# Patient Record
Sex: FEMALE | Race: WHITE | Employment: PART TIME | URBAN - METROPOLITAN AREA
[De-identification: names, ages, dates, MRNs, and addresses within clinical notes are randomized per-mention and may not be internally consistent; named-entity substitution may affect disease eponyms.]

---

## 2017-01-13 ENCOUNTER — NURSE ONLY (OUTPATIENT)
Dept: FAMILY MEDICINE CLINIC | Facility: CLINIC | Age: 37
End: 2017-01-13

## 2017-01-13 PROCEDURE — 96372 THER/PROPH/DIAG INJ SC/IM: CPT | Performed by: PHYSICIAN ASSISTANT

## 2017-01-13 RX ADMIN — CYANOCOBALAMIN 1000 MCG: 1000 INJECTION INTRAMUSCULAR; SUBCUTANEOUS at 09:40:00

## 2017-01-13 NOTE — PROGRESS NOTES
Notes Recorded by Titus Guerra PA-C on 11/21/2016 at 1:00 PM  Labs are stable  B12 is still low, would still continue B12 injections monthly  F/U as directed    Vitamin B12 [phone] pg/mL Final 11/16/2016 11:31 AM [facility]     Assessment and P

## 2017-02-03 ENCOUNTER — APPOINTMENT (OUTPATIENT)
Dept: LAB | Age: 37
End: 2017-02-03
Attending: INTERNAL MEDICINE
Payer: COMMERCIAL

## 2017-02-03 DIAGNOSIS — E06.3 HASHIMOTO'S DISEASE: ICD-10-CM

## 2017-02-03 LAB
FREE T4: 1.1 NG/DL (ref 0.9–1.8)
T3FREE SERPL-MCNC: 2.63 PG/ML (ref 2.3–4.2)
TSI SER-ACNC: 0.53 MIU/ML (ref 0.35–5.5)

## 2017-02-03 PROCEDURE — 36415 COLL VENOUS BLD VENIPUNCTURE: CPT

## 2017-02-03 PROCEDURE — 84439 ASSAY OF FREE THYROXINE: CPT

## 2017-02-03 PROCEDURE — 84443 ASSAY THYROID STIM HORMONE: CPT

## 2017-02-03 PROCEDURE — 84481 FREE ASSAY (FT-3): CPT

## 2017-02-16 ENCOUNTER — NURSE ONLY (OUTPATIENT)
Dept: FAMILY MEDICINE CLINIC | Facility: CLINIC | Age: 37
End: 2017-02-16

## 2017-02-16 DIAGNOSIS — E53.8 VITAMIN B12 DEFICIENCY: Primary | ICD-10-CM

## 2017-02-16 PROCEDURE — 96372 THER/PROPH/DIAG INJ SC/IM: CPT | Performed by: PHYSICIAN ASSISTANT

## 2017-02-16 RX ADMIN — CYANOCOBALAMIN 1000 MCG: 1000 INJECTION INTRAMUSCULAR; SUBCUTANEOUS at 10:48:00

## 2017-02-16 NOTE — PROGRESS NOTES
Notes Recorded by Nat Monreal PA-C on 11/21/2016 at 1:00 PM  Labs are stable  B12 is still low, would still continue B12 injections monthly  F/U as directed       Result Information      Status Provider Status        Final result (11/16/2016  7:39

## 2017-03-22 ENCOUNTER — NURSE ONLY (OUTPATIENT)
Dept: FAMILY MEDICINE CLINIC | Facility: CLINIC | Age: 37
End: 2017-03-22

## 2017-03-22 DIAGNOSIS — E53.8 VITAMIN B12 DEFICIENCY: Primary | ICD-10-CM

## 2017-03-22 PROCEDURE — 96372 THER/PROPH/DIAG INJ SC/IM: CPT | Performed by: PHYSICIAN ASSISTANT

## 2017-03-22 RX ADMIN — CYANOCOBALAMIN 1000 MCG: 1000 INJECTION INTRAMUSCULAR; SUBCUTANEOUS at 09:24:00

## 2017-03-22 NOTE — PROGRESS NOTES
Notes Recorded by Rosanna Aguilera PA-C on 11/21/2016 at 1:00 PM  Labs are stable  B12 is still low, would still continue B12 injections monthly  F/U as directed         Result Information     [facility] Provider Status           Final result (11/16/20

## 2017-04-25 ENCOUNTER — NURSE ONLY (OUTPATIENT)
Dept: FAMILY MEDICINE CLINIC | Facility: CLINIC | Age: 37
End: 2017-04-25

## 2017-04-25 PROCEDURE — 96372 THER/PROPH/DIAG INJ SC/IM: CPT | Performed by: PHYSICIAN ASSISTANT

## 2017-04-25 RX ADMIN — CYANOCOBALAMIN 1000 MCG: 1000 INJECTION INTRAMUSCULAR; SUBCUTANEOUS at 14:58:00

## 2017-04-25 NOTE — PROGRESS NOTES
Notes Recorded by Titus Guerra PA-C on 11/21/2016 at 1:00 PM  Labs are stable  B12 is still low, would still continue B12 injections monthly  F/U as directed           Result Information Aubree Reddy Status   Provider Status              Final result (

## 2017-04-25 NOTE — PROGRESS NOTES
Patient is here for Vitamin B12 injection #5. Given to left deltoid IM without incident. Scheduled for next shot in one month. Will be last shot.

## 2017-05-05 ENCOUNTER — OFFICE VISIT (OUTPATIENT)
Dept: FAMILY MEDICINE CLINIC | Facility: CLINIC | Age: 37
End: 2017-05-05

## 2017-05-05 VITALS
DIASTOLIC BLOOD PRESSURE: 70 MMHG | WEIGHT: 125 LBS | HEART RATE: 68 BPM | SYSTOLIC BLOOD PRESSURE: 90 MMHG | RESPIRATION RATE: 16 BRPM | BODY MASS INDEX: 20.09 KG/M2 | HEIGHT: 66.25 IN | TEMPERATURE: 99 F

## 2017-05-05 DIAGNOSIS — E06.3 HASHIMOTO'S DISEASE: ICD-10-CM

## 2017-05-05 DIAGNOSIS — E55.9 VITAMIN D DEFICIENCY: ICD-10-CM

## 2017-05-05 DIAGNOSIS — E53.8 VITAMIN B12 DEFICIENCY: Primary | ICD-10-CM

## 2017-05-05 PROCEDURE — 99213 OFFICE O/P EST LOW 20 MIN: CPT | Performed by: INTERNAL MEDICINE

## 2017-05-05 RX ORDER — LIOTHYRONINE SODIUM 5 UG/1
5 TABLET ORAL DAILY
COMMUNITY
Start: 2017-05-02 | End: 2021-01-04

## 2017-05-05 NOTE — PATIENT INSTRUCTIONS
Thank you for choosing Rizwan Hernandez MD at [place] [address]  To Do: Karen De Jesus      • Please signup for MY CHART, which is electronic access to your record if you have not done so.  All your results will post on there.  [email or web address] you regarding any testing results. Refill policies:   Allow 3 business days for refills; controlled substances may take longer and must be picked up from the office in person.   Narcotic medications can only be filled in 30 day increments and must be ref

## 2017-05-05 NOTE — PROGRESS NOTES
[facility] Internal Medicine Office Note  Chief Complaint:   Patient presents with:  Establish Care      HPI:   This is a 39year old female coming in for establishing care with a new provider.  She has been feeling well and much better than in 20 Tab Take 30 mg by mouth one time. Disp:  Rfl:    MAGNESIUM GLYCINATE PLUS OR Take by mouth. Disp:  Rfl:    Levothyroxine Sodium 50 MCG Oral Tab Take 1 tablet (50 mcg total) by mouth daily.  Disp: 30 tablet Rfl: 6   Liothyronine Sodium 5 MCG Oral Tab Take 5 medicine doctor trying to go off of injections and taking oral replacement only.  I have some concern about this because of malabsorption of numerous other vitamins and minerals and she was symptomatic with parasthesias, although the lowest B12 in EPIC is a

## 2017-11-28 ENCOUNTER — TELEPHONE (OUTPATIENT)
Dept: FAMILY MEDICINE CLINIC | Facility: CLINIC | Age: 37
End: 2017-11-28

## 2017-11-28 NOTE — TELEPHONE ENCOUNTER
Advised pt that she can receive injection and TB test at her next OV. Patient verbalized understanding.    Future Appointments  Date Time Provider Petros Garcia   12/8/2017 9:00 AM Benja Sandhu MD [facility] [address] [facility] [address]

## 2017-11-28 NOTE — TELEPHONE ENCOUNTER
Dr Akira Ibrahim pt wants to know if she can have a tdap and tb test  on the same day as her appt on 12/8/17 , would like a nurse to call her back on decision

## 2017-12-08 ENCOUNTER — OFFICE VISIT (OUTPATIENT)
Dept: INTERNAL MEDICINE CLINIC | Facility: CLINIC | Age: 37
End: 2017-12-08

## 2017-12-08 ENCOUNTER — LAB ENCOUNTER (OUTPATIENT)
Dept: LAB | Age: 37
End: 2017-12-08
Attending: INTERNAL MEDICINE
Payer: COMMERCIAL

## 2017-12-08 VITALS
SYSTOLIC BLOOD PRESSURE: 94 MMHG | BODY MASS INDEX: 19.85 KG/M2 | RESPIRATION RATE: 16 BRPM | HEIGHT: 68 IN | WEIGHT: 131 LBS | HEART RATE: 76 BPM | DIASTOLIC BLOOD PRESSURE: 60 MMHG

## 2017-12-08 DIAGNOSIS — Z00.00 LABORATORY EXAM ORDERED AS PART OF ROUTINE GENERAL MEDICAL EXAMINATION: ICD-10-CM

## 2017-12-08 DIAGNOSIS — N28.1 RENAL CYST: ICD-10-CM

## 2017-12-08 DIAGNOSIS — Z00.00 WELLNESS EXAMINATION: Primary | ICD-10-CM

## 2017-12-08 PROCEDURE — 99395 PREV VISIT EST AGE 18-39: CPT | Performed by: INTERNAL MEDICINE

## 2017-12-08 PROCEDURE — 80053 COMPREHEN METABOLIC PANEL: CPT

## 2017-12-08 PROCEDURE — 36415 COLL VENOUS BLD VENIPUNCTURE: CPT

## 2017-12-08 PROCEDURE — 90471 IMMUNIZATION ADMIN: CPT | Performed by: INTERNAL MEDICINE

## 2017-12-08 PROCEDURE — 85025 COMPLETE CBC W/AUTO DIFF WBC: CPT

## 2017-12-08 PROCEDURE — 90715 TDAP VACCINE 7 YRS/> IM: CPT | Performed by: INTERNAL MEDICINE

## 2017-12-08 RX ORDER — THYROID,PORK 48.75 MG
2 TABLET ORAL DAILY
COMMUNITY
Start: 2017-11-30 | End: 2021-01-04

## 2017-12-08 NOTE — PROGRESS NOTES
[facility] Internal Medicine Office Note  Chief Complaint:   Patient presents with:  Wellness Visit: tdap      HPI:   This is a 44year old female coming in for physical  HPI  She follows with a functional medicine doctor and no longer sees Dr. Sunni Saini Zinc 30 MG Oral Tab Take 30 mg by mouth one time. Disp:  Rfl:    MAGNESIUM GLYCINATE PLUS OR Take by mouth. Disp:  Rfl:          REVIEW OF SYSTEMS:   Review of Systems   Constitutional: Negative for fever. HENT: Negative for rhinorrhea.     Eyes: Marge Aus testing and chol was low. Laboratory exam ordered as part of routine general medical examination  -     CBC WITH DIFFERENTIAL WITH PLATELET; Future  -     COMP METABOLIC PANEL (14); Future    Renal cyst  -     US KIDNEYS (VIW=[id number]);  Future    Other or

## 2017-12-12 ENCOUNTER — HOSPITAL ENCOUNTER (OUTPATIENT)
Dept: ULTRASOUND IMAGING | Age: 37
Discharge: HOME | End: 2017-12-12
Attending: INTERNAL MEDICINE
Payer: COMMERCIAL

## 2017-12-12 DIAGNOSIS — N28.1 RENAL CYST: ICD-10-CM

## 2017-12-12 PROCEDURE — 76775 US EXAM ABDO BACK WALL LIM: CPT | Performed by: INTERNAL MEDICINE

## 2018-02-05 PROCEDURE — 81015 MICROSCOPIC EXAM OF URINE: CPT | Performed by: UROLOGY

## 2019-05-31 PROCEDURE — 88175 CYTOPATH C/V AUTO FLUID REDO: CPT | Performed by: OBSTETRICS & GYNECOLOGY

## 2019-05-31 PROCEDURE — 87624 HPV HI-RISK TYP POOLED RSLT: CPT | Performed by: OBSTETRICS & GYNECOLOGY

## 2019-06-07 ENCOUNTER — OFFICE VISIT (OUTPATIENT)
Dept: INTERNAL MEDICINE CLINIC | Facility: CLINIC | Age: 39
End: 2019-06-07
Payer: COMMERCIAL

## 2019-06-07 VITALS
DIASTOLIC BLOOD PRESSURE: 72 MMHG | BODY MASS INDEX: 20.72 KG/M2 | RESPIRATION RATE: 16 BRPM | TEMPERATURE: 98 F | HEART RATE: 64 BPM | WEIGHT: 132 LBS | SYSTOLIC BLOOD PRESSURE: 120 MMHG | HEIGHT: 67 IN

## 2019-06-07 DIAGNOSIS — Z00.00 ENCOUNTER FOR ROUTINE ADULT MEDICAL EXAMINATION: Primary | ICD-10-CM

## 2019-06-07 DIAGNOSIS — E53.8 VITAMIN B12 DEFICIENCY: ICD-10-CM

## 2019-06-07 DIAGNOSIS — Z00.00 LABORATORY EXAM ORDERED AS PART OF ROUTINE GENERAL MEDICAL EXAMINATION: ICD-10-CM

## 2019-06-07 DIAGNOSIS — E55.9 HYPOVITAMINOSIS D: ICD-10-CM

## 2019-06-07 DIAGNOSIS — E06.3 HASHIMOTO'S DISEASE: ICD-10-CM

## 2019-06-07 DIAGNOSIS — Q61.02 MULTIPLE RENAL CYSTS: ICD-10-CM

## 2019-06-07 PROCEDURE — 99395 PREV VISIT EST AGE 18-39: CPT | Performed by: INTERNAL MEDICINE

## 2019-06-07 PROCEDURE — 96372 THER/PROPH/DIAG INJ SC/IM: CPT | Performed by: INTERNAL MEDICINE

## 2019-06-07 RX ORDER — CYANOCOBALAMIN 1000 UG/ML
1000 INJECTION INTRAMUSCULAR; SUBCUTANEOUS
Status: SHIPPED | OUTPATIENT
Start: 2019-06-07 | End: 2020-06-01

## 2019-06-07 RX ADMIN — CYANOCOBALAMIN 1000 MCG: 1000 INJECTION INTRAMUSCULAR; SUBCUTANEOUS at 09:28:00

## 2019-06-07 NOTE — PROGRESS NOTES
[facility] Internal Medicine Office Note  Chief Complaint:   Patient presents with:  CPX: not fasting       HPI:   This is a 43year old female coming in for physical  HPI  She would like a recommendation for a physician who prescribes liothyroni OR Take by mouth. Disp:  Rfl:          REVIEW OF SYSTEMS:   Review of Systems   Constitutional: Negative for fever. HENT: Negative for congestion. Eyes: Negative for visual disturbance. Respiratory: Negative for shortness of breath.     Buck Valles cpx.    Diagnoses and all orders for this visit:    Encounter for routine adult medical examination -up to date with Tdap  -flu shot recommended this fall   -she has already had 2 mammograms and follows with her gynecologist who does not think she needs an treatments as a result of today.      Junior Sahu MD

## 2019-09-11 ENCOUNTER — PATIENT MESSAGE (OUTPATIENT)
Dept: INTERNAL MEDICINE CLINIC | Facility: CLINIC | Age: 39
End: 2019-09-11

## 2019-09-11 NOTE — TELEPHONE ENCOUNTER
From: Columba Sommer  To: Stephany John MD  Sent: 9/11/2019 11:14 AM CDT  Subject: Other    Dr. Dave Davey,     I wanted to let you know that I complete my lab work today.  It was delayed due to having rescheduled with the Functional Medical Practitioner ishaan bey

## 2019-09-11 NOTE — TELEPHONE ENCOUNTER
From: Dayron Hinojosa  To: Anish Coello MD  Sent: 9/11/2019 11:32 AM CDT  Subject: Other    Dr. Valenzuela Comment,      I wanted to follow up with you on the order for the ultrasound on the kidney.  I saw Dr. Candie Gallo on Feb 2018, and at that time the plan was to follow back

## 2019-12-16 ENCOUNTER — OFFICE VISIT (OUTPATIENT)
Dept: FAMILY MEDICINE CLINIC | Facility: CLINIC | Age: 39
End: 2019-12-16
Payer: COMMERCIAL

## 2019-12-16 VITALS
OXYGEN SATURATION: 98 % | WEIGHT: 134.38 LBS | SYSTOLIC BLOOD PRESSURE: 120 MMHG | RESPIRATION RATE: 16 BRPM | HEIGHT: 67 IN | DIASTOLIC BLOOD PRESSURE: 64 MMHG | TEMPERATURE: 98 F | BODY MASS INDEX: 21.09 KG/M2 | HEART RATE: 79 BPM

## 2019-12-16 DIAGNOSIS — J01.00 ACUTE MAXILLARY SINUSITIS, RECURRENCE NOT SPECIFIED: Primary | ICD-10-CM

## 2019-12-16 PROCEDURE — 99213 OFFICE O/P EST LOW 20 MIN: CPT | Performed by: NURSE PRACTITIONER

## 2019-12-16 RX ORDER — AMOXICILLIN 875 MG/1
875 TABLET, COATED ORAL 2 TIMES DAILY
Qty: 20 TABLET | Refills: 0 | Status: SHIPPED | OUTPATIENT
Start: 2019-12-16 | End: 2019-12-26

## 2019-12-16 NOTE — PROGRESS NOTES
CHIEF COMPLAINT:   Patient presents with:  Nasal Congestion: runny nose, slight cought, lost voice x 3 days       HPI:   Nicole Lizarraga is a 35year old female who presents for cold symptoms for  3  days.  Symptoms have progressed into sinus congestion and bee LUNGS: denies shortness of breath or wheezing, See HPI  CARDIOVASCULAR: denies chest pain or palpitations   GI: denies N/V/C or abdominal pain  NEURO: + sinus headaches. No numbness or tingling in face.     EXAM:   /64 (BP Location: Right arm, Patien Sinusitis can often be managed with self-care. Self-care can keep sinuses moist and make you feel more comfortable. Remember to follow your doctor's instructions closely. This can make a big difference in getting your sinus problem under control.   Drink fl The patient is asked to f/u with PCP if sx's persist or worsen.

## 2020-04-24 ENCOUNTER — TELEPHONE (OUTPATIENT)
Dept: INTERNAL MEDICINE CLINIC | Facility: CLINIC | Age: 40
End: 2020-04-24

## 2020-04-24 NOTE — TELEPHONE ENCOUNTER
mammo ordered as requested; ok to wait in light of covid-19 pandemic unless she has a specific concern

## 2020-04-24 NOTE — TELEPHONE ENCOUNTER
LOV 6/7/2019    Future Appointments   Date Time Provider Petros Garcia   6/9/2020  1:30 PM Gagan Valencia MD [facility] [address]

## 2020-06-09 ENCOUNTER — OFFICE VISIT (OUTPATIENT)
Dept: INTERNAL MEDICINE CLINIC | Facility: CLINIC | Age: 40
End: 2020-06-09
Payer: COMMERCIAL

## 2020-06-09 VITALS
HEIGHT: 67 IN | DIASTOLIC BLOOD PRESSURE: 62 MMHG | TEMPERATURE: 99 F | HEART RATE: 78 BPM | SYSTOLIC BLOOD PRESSURE: 110 MMHG | BODY MASS INDEX: 21.27 KG/M2 | WEIGHT: 135.5 LBS

## 2020-06-09 DIAGNOSIS — Z80.3 FAMILY HISTORY OF BREAST CANCER IN MOTHER: ICD-10-CM

## 2020-06-09 DIAGNOSIS — E55.9 HYPOVITAMINOSIS D: ICD-10-CM

## 2020-06-09 DIAGNOSIS — Z00.00 LABORATORY EXAM ORDERED AS PART OF ROUTINE GENERAL MEDICAL EXAMINATION: ICD-10-CM

## 2020-06-09 DIAGNOSIS — E53.8 VITAMIN B12 DEFICIENCY: ICD-10-CM

## 2020-06-09 DIAGNOSIS — E06.3 HASHIMOTO'S DISEASE: ICD-10-CM

## 2020-06-09 DIAGNOSIS — Z00.00 ENCOUNTER FOR ROUTINE ADULT MEDICAL EXAMINATION: Primary | ICD-10-CM

## 2020-06-09 DIAGNOSIS — Z12.31 ENCOUNTER FOR SCREENING MAMMOGRAM FOR BREAST CANCER: ICD-10-CM

## 2020-06-09 PROCEDURE — 99396 PREV VISIT EST AGE 40-64: CPT | Performed by: INTERNAL MEDICINE

## 2020-06-09 NOTE — PROGRESS NOTES
[facility] Internal Medicine Office Note  Chief Complaint:   Patient presents with:  CPX      HPI:   This is a 35year old female coming in for physical  HPI    She follows with integrative medicine    Hypothyroidism - on liothyronine     Vit D a fever.   HENT: Negative for congestion. Eyes: Negative for visual disturbance. Respiratory: Negative for shortness of breath. Cardiovascular: Negative for chest pain. Gastrointestinal: Negative for constipation.    Genitourinary: Negative for dysu history of breast cancer in mother  -     [facility] [place] 2D+3D SCREENING BILAT (CPT=77067/49392); Future    Encounter for screening mammogram for breast cancer  -     [facility] [place] 2D+3D SCREENING BILAT (CPT=77067/24144);  Future    Vitamin B12 deficiency -check labs

## 2020-06-13 PROBLEM — Z80.3 FAMILY HISTORY OF BREAST CANCER IN MOTHER: Status: ACTIVE | Noted: 2020-06-13

## 2020-06-25 ENCOUNTER — HOSPITAL ENCOUNTER (OUTPATIENT)
Dept: MAMMOGRAPHY | Age: 40
Discharge: HOME | End: 2020-06-25
Attending: INTERNAL MEDICINE
Payer: COMMERCIAL

## 2020-06-25 ENCOUNTER — LAB ENCOUNTER (OUTPATIENT)
Dept: LAB | Age: 40
End: 2020-06-25
Attending: INTERNAL MEDICINE
Payer: COMMERCIAL

## 2020-06-25 DIAGNOSIS — Z80.3 FAMILY HISTORY OF BREAST CANCER IN MOTHER: ICD-10-CM

## 2020-06-25 DIAGNOSIS — E55.9 HYPOVITAMINOSIS D: ICD-10-CM

## 2020-06-25 DIAGNOSIS — E53.8 VITAMIN B12 DEFICIENCY: ICD-10-CM

## 2020-06-25 DIAGNOSIS — E06.3 HASHIMOTO'S DISEASE: ICD-10-CM

## 2020-06-25 DIAGNOSIS — Z00.00 LABORATORY EXAM ORDERED AS PART OF ROUTINE GENERAL MEDICAL EXAMINATION: ICD-10-CM

## 2020-06-25 DIAGNOSIS — Z12.31 ENCOUNTER FOR SCREENING MAMMOGRAM FOR BREAST CANCER: ICD-10-CM

## 2020-06-25 PROCEDURE — 80053 COMPREHEN METABOLIC PANEL: CPT

## 2020-06-25 PROCEDURE — 77067 SCR MAMMO BI INCL CAD: CPT | Performed by: INTERNAL MEDICINE

## 2020-06-25 PROCEDURE — 84439 ASSAY OF FREE THYROXINE: CPT

## 2020-06-25 PROCEDURE — 36415 COLL VENOUS BLD VENIPUNCTURE: CPT

## 2020-06-25 PROCEDURE — 80061 LIPID PANEL: CPT

## 2020-06-25 PROCEDURE — 84443 ASSAY THYROID STIM HORMONE: CPT

## 2020-06-25 PROCEDURE — 82607 VITAMIN B-12: CPT

## 2020-06-25 PROCEDURE — 77063 BREAST TOMOSYNTHESIS BI: CPT | Performed by: INTERNAL MEDICINE

## 2020-06-25 PROCEDURE — 82306 VITAMIN D 25 HYDROXY: CPT

## 2020-06-25 PROCEDURE — 85025 COMPLETE CBC W/AUTO DIFF WBC: CPT

## 2020-06-29 ENCOUNTER — HOSPITAL ENCOUNTER (OUTPATIENT)
Dept: ULTRASOUND IMAGING | Age: 40
Discharge: HOME | End: 2020-06-29
Attending: INTERNAL MEDICINE
Payer: COMMERCIAL

## 2020-06-29 DIAGNOSIS — R92.2 INCONCLUSIVE MAMMOGRAM: ICD-10-CM

## 2020-06-29 PROCEDURE — 76641 ULTRASOUND BREAST COMPLETE: CPT | Performed by: INTERNAL MEDICINE

## 2020-06-30 DIAGNOSIS — R92.8 ABNORMAL MAMMOGRAM: Primary | ICD-10-CM

## 2020-08-06 ENCOUNTER — OFFICE VISIT (OUTPATIENT)
Dept: FAMILY MEDICINE CLINIC | Facility: CLINIC | Age: 40
End: 2020-08-06
Payer: COMMERCIAL

## 2020-08-06 VITALS
RESPIRATION RATE: 16 BRPM | SYSTOLIC BLOOD PRESSURE: 135 MMHG | WEIGHT: 136.63 LBS | TEMPERATURE: 97 F | DIASTOLIC BLOOD PRESSURE: 58 MMHG | HEART RATE: 74 BPM | OXYGEN SATURATION: 99 % | BODY MASS INDEX: 21 KG/M2

## 2020-08-06 DIAGNOSIS — L03.115 CELLULITIS OF RIGHT LOWER EXTREMITY: Primary | ICD-10-CM

## 2020-08-06 PROCEDURE — 3078F DIAST BP <80 MM HG: CPT | Performed by: NURSE PRACTITIONER

## 2020-08-06 PROCEDURE — 3075F SYST BP GE 130 - 139MM HG: CPT | Performed by: NURSE PRACTITIONER

## 2020-08-06 PROCEDURE — 99213 OFFICE O/P EST LOW 20 MIN: CPT | Performed by: NURSE PRACTITIONER

## 2020-08-06 RX ORDER — CEPHALEXIN 500 MG/1
500 CAPSULE ORAL 4 TIMES DAILY
Qty: 28 CAPSULE | Refills: 0 | Status: SHIPPED | OUTPATIENT
Start: 2020-08-06 | End: 2020-08-13

## 2020-08-06 NOTE — PATIENT INSTRUCTIONS
Discharge Instructions for Cellulitis   You have been diagnosed with cellulitis. This is an infection in the deepest layer of the skin and tissue beneath the skin. In some cases, the infection also affects the muscle. Cellulitis is caused by bacteria.  Lencho Warren · Vomiting  Jake last reviewed this educational content on 11/1/2019  © 2496-4932 [employer] [address] [employer] [employer] [facility] [employer], [facility] [employer] [place]. All rights reserved. This information is not intended as a substitute for professional medical care.

## 2020-08-06 NOTE — PROGRESS NOTES
CHIEF COMPLAINT:   Patient presents with:  Cellulitis: right foot      HPI:     Isha Rob is a 38year old female who presents with concerns of infection to right foot.  Pt reports Thursday last week a nail got stuck in her right shoe and slightly pierced /58   Pulse 74   Temp 97.2 °F (36.2 °C) (Temporal)   Resp 16   Wt 136 lb 9.6 oz (62 kg)   LMP 06/23/2020 (Exact Date)   SpO2 99%   Breastfeeding No   BMI 21.39 kg/m²   GENERAL: well developed, well nourished,in no apparent distress  SKIN: inferior as · Take the prescribed antibiotic medicine you are given as directed until it is gone. Take it even if you feel better. It treats the infection and stops it from returning. Not taking all the medicine can make future infections hard to treat.   · Keep the in

## 2020-08-25 ENCOUNTER — TELEPHONE (OUTPATIENT)
Dept: INTERNAL MEDICINE CLINIC | Facility: CLINIC | Age: 40
End: 2020-08-25

## 2020-08-25 NOTE — TELEPHONE ENCOUNTER
Pt seeking an appointment to come into the office for a lab result discussion. 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 Pt asking Dr Farrah Marsh next working Saturday date, since 08/29/2020 date is booked.

## 2020-08-26 NOTE — TELEPHONE ENCOUNTER
Please ask if she would be willing to do a video visit and I can add her on to the schedule at the end of the school day. (Ok to add on a 3pm or 3:30pm on any Thursday)   My next Sat will be in October but the date has not been determined yet.

## 2020-08-27 ENCOUNTER — TELEMEDICINE (OUTPATIENT)
Dept: INTERNAL MEDICINE CLINIC | Facility: CLINIC | Age: 40
End: 2020-08-27
Payer: COMMERCIAL

## 2020-08-27 DIAGNOSIS — E55.9 HYPOVITAMINOSIS D: ICD-10-CM

## 2020-08-27 DIAGNOSIS — E60 ZINC DEFICIENCY: ICD-10-CM

## 2020-08-27 DIAGNOSIS — E06.3 HASHIMOTO'S DISEASE: ICD-10-CM

## 2020-08-27 DIAGNOSIS — E53.8 VITAMIN B12 DEFICIENCY: Primary | ICD-10-CM

## 2020-08-27 PROCEDURE — 99213 OFFICE O/P EST LOW 20 MIN: CPT | Performed by: INTERNAL MEDICINE

## 2020-08-27 NOTE — PROGRESS NOTES
This is a telemedicine visit with live, interactive video and audio. Patient understands and accepts financial responsibility for any deductible, co-insurance and/or co-pays associated with this service.     SUBJECTIVE  She had blood work done through h Disorder Mother         gastritis   • Breast Cancer Mother 72        BRCA negative.    • Gastro-Intestinal Disorder Other         anunts and cousins w/ throyid diease      Social History    Tobacco Use      Smoking status: Never Smoker      Smokeless tobacc further eval for multiple vitamin deficiencies and ?malabsorption. Also gave info for multidisciplinary clinic at [facility] for food allergy. Other orders  -     Cyanocobalamin 1000 MCG/ML Injection Kit; Inject 1,000 mcg as directed every 30 (thirty) days.

## 2020-09-17 ENCOUNTER — OFFICE VISIT (OUTPATIENT)
Dept: FAMILY MEDICINE CLINIC | Facility: CLINIC | Age: 40
End: 2020-09-17
Payer: COMMERCIAL

## 2020-09-17 DIAGNOSIS — E53.8 VITAMIN B12 DEFICIENCY: Primary | ICD-10-CM

## 2020-09-17 PROCEDURE — 99211 OFF/OP EST MAY X REQ PHY/QHP: CPT | Performed by: NURSE PRACTITIONER

## 2020-09-17 PROCEDURE — 96372 THER/PROPH/DIAG INJ SC/IM: CPT | Performed by: PHYSICIAN ASSISTANT

## 2020-09-17 RX ADMIN — CYANOCOBALAMIN 1000 MCG: 1000 INJECTION INTRAMUSCULAR; SUBCUTANEOUS at 16:55:00

## 2020-09-17 NOTE — PROGRESS NOTES
HPI: Patient presents for Vitamin B12 injection ordered by her pcp for vitamin B12 deficiency. Patient denies any other concerns. Assessment: Vitamin B 12 deficiency. Plan: Medication as administered in left deltoid. Pt tolerated injection well.

## 2020-10-13 ENCOUNTER — OFFICE VISIT (OUTPATIENT)
Dept: SURGERY | Facility: CLINIC | Age: 40
End: 2020-10-13
Payer: COMMERCIAL

## 2020-10-13 VITALS
HEART RATE: 82 BPM | RESPIRATION RATE: 16 BRPM | OXYGEN SATURATION: 98 % | SYSTOLIC BLOOD PRESSURE: 118 MMHG | DIASTOLIC BLOOD PRESSURE: 75 MMHG

## 2020-10-13 DIAGNOSIS — N60.02 BREAST CYST, LEFT: Primary | ICD-10-CM

## 2020-10-13 DIAGNOSIS — R92.2 DENSE BREAST: ICD-10-CM

## 2020-10-13 DIAGNOSIS — Z80.3 FAMILY HISTORY OF BREAST CANCER: ICD-10-CM

## 2020-10-13 PROCEDURE — 3074F SYST BP LT 130 MM HG: CPT | Performed by: SURGERY

## 2020-10-13 PROCEDURE — 99072 ADDL SUPL MATRL&STAF TM PHE: CPT | Performed by: SURGERY

## 2020-10-13 PROCEDURE — 3078F DIAST BP <80 MM HG: CPT | Performed by: SURGERY

## 2020-10-13 PROCEDURE — 99244 OFF/OP CNSLTJ NEW/EST MOD 40: CPT | Performed by: SURGERY

## 2020-10-13 RX ORDER — EPINEPHRINE 0.3 MG/.3ML
INJECTION SUBCUTANEOUS
COMMUNITY
Start: 2020-09-28

## 2020-10-13 RX ORDER — CYANOCOBALAMIN 1000 UG/ML
1 INJECTION INTRAMUSCULAR; SUBCUTANEOUS
COMMUNITY
End: 2020-12-17

## 2020-10-29 ENCOUNTER — OFFICE VISIT (OUTPATIENT)
Dept: FAMILY MEDICINE CLINIC | Facility: CLINIC | Age: 40
End: 2020-10-29
Payer: COMMERCIAL

## 2020-10-29 DIAGNOSIS — E53.8 VITAMIN B12 DEFICIENCY: Primary | ICD-10-CM

## 2020-10-29 PROCEDURE — 99211 OFF/OP EST MAY X REQ PHY/QHP: CPT | Performed by: NURSE PRACTITIONER

## 2020-10-29 PROCEDURE — 96372 THER/PROPH/DIAG INJ SC/IM: CPT | Performed by: PHYSICIAN ASSISTANT

## 2020-10-29 RX ADMIN — CYANOCOBALAMIN 1000 MCG: 1000 INJECTION INTRAMUSCULAR; SUBCUTANEOUS at 13:52:00

## 2020-10-29 NOTE — PROGRESS NOTES
HPI: Patient presents for Vitamin B12 injection ordered by her pcp for vitamin B12 deficiency. Patient denies any other concerns.      Assessment: Vitamin B 12 deficiency.      Plan: Medication as administered in MAR  Pt tolerated injection well.    To summer

## 2020-11-05 ENCOUNTER — APPOINTMENT (OUTPATIENT)
Dept: LAB | Age: 40
End: 2020-11-05
Attending: PHYSICIAN ASSISTANT
Payer: COMMERCIAL

## 2020-11-05 ENCOUNTER — TELEMEDICINE (OUTPATIENT)
Dept: INTERNAL MEDICINE CLINIC | Facility: CLINIC | Age: 40
End: 2020-11-05
Payer: COMMERCIAL

## 2020-11-05 DIAGNOSIS — J02.9 SORE THROAT: ICD-10-CM

## 2020-11-05 DIAGNOSIS — Z20.822 EXPOSURE TO COVID-19 VIRUS: ICD-10-CM

## 2020-11-05 DIAGNOSIS — R53.83 FATIGUE, UNSPECIFIED TYPE: ICD-10-CM

## 2020-11-05 DIAGNOSIS — R05.9 COUGH: ICD-10-CM

## 2020-11-05 DIAGNOSIS — R52 BODY ACHES: ICD-10-CM

## 2020-11-05 DIAGNOSIS — R52 BODY ACHES: Primary | ICD-10-CM

## 2020-11-05 PROCEDURE — 99214 OFFICE O/P EST MOD 30 MIN: CPT | Performed by: PHYSICIAN ASSISTANT

## 2020-11-05 NOTE — PROGRESS NOTES
Virtual Video Check-In     This visit is conducted using Telemedicine with live, interactive video and audio. Jose Manuel Bella, who has verified his/her identification by name and , verbally consents to a Virtual/Telephone Check-In visit on 20.   Diana León Hashimoto's disease 11/23/2016   • IUD (intrauterine device) in place 06/2019   • Malabsorption    • Vitamin B 12 deficiency    • Vitamin D deficiency       No past surgical history on file.    Social History    Tobacco Use      Smoking status: Never Smoker be fully performed. Every conscious effort was taken to allow for sufficient and adequate time. Included in this visit, time may have been spent reviewing labs, medications, radiology tests and decision-making.   Appropriate medical decision-making and te

## 2020-11-05 NOTE — PATIENT INSTRUCTIONS
Symptom relief:  - you may take Tylenol (acetaminophen) 1,000 mg every 8 hours for fever, body aches, sore throat, headache  - avoid anti-inflammatory medications (advil, motrin, ibuprofen, aleve, naproxen) UNLESS you are unable to keep fever down or contr days have passed since your symptoms first appeared. Look for emergency warning signs for COVID-19.  If you are showing any of these signs, seek emergency medical care immediately:  Trouble breathing  Persistent pain or pressure in the chest  New confusi

## 2020-11-19 NOTE — PROGRESS NOTES
Breast Surgery New Patient Consultation    This is the first visit for this 38year old woman, referred by Dr. Arben Hunter, who presents for evaluation of breast cysts.     History of Present Illness:   Ms. Reina Edwards is a 38year old woman who presents with unknown. She achieved menarche at age 14 and LMP  LMP: 10/09/20(IUD)  She has history of oral contraceptive use for 8 years, last 2009. She denies infertility treatment to achieve pregnancy.     Medications:      •  cyanocobalamin 1000 MCG/ML Injection So in appetite or weight loss. HEENT:     The patient denies eye irritation, cataracts, redness, glaucoma, yellowing of the eyes, change in vision or color blindness.  The patient denies hearing loss, ringing in the ears, ear drainage, earaches, nasal conge problems, loss of sensation/numbness, problems walking, weakness, tingling or burning in hands/feet. There is no history of abusive relationship, bipolar disorder, sleep disturbance, anxiety, depression or feeling of despair. Endocrine:     There is no h tender. The liver is not enlarged. There are no palpable masses. Lymph Nodes: The supraclavicular, axillary and cervical regions are free of significant lymphadenopathy. Back: There is no vertebral column tenderness.     Skin: The skin appears normal December 2020. I will contact her with results when they are available and subsequent surveillance plan will be dictated accordingly. She was given ample opportunity for questions and those questions were answered to her satisfaction.  She has been  encour

## 2020-12-17 ENCOUNTER — OFFICE VISIT (OUTPATIENT)
Dept: FAMILY MEDICINE CLINIC | Facility: CLINIC | Age: 40
End: 2020-12-17
Payer: COMMERCIAL

## 2020-12-17 DIAGNOSIS — E53.8 B12 DEFICIENCY: Primary | ICD-10-CM

## 2020-12-17 PROCEDURE — 96372 THER/PROPH/DIAG INJ SC/IM: CPT | Performed by: NURSE PRACTITIONER

## 2020-12-17 RX ORDER — CYANOCOBALAMIN 1000 UG/ML
1000 INJECTION INTRAMUSCULAR; SUBCUTANEOUS ONCE
Status: COMPLETED | OUTPATIENT
Start: 2020-12-17 | End: 2020-12-17

## 2020-12-17 RX ADMIN — CYANOCOBALAMIN 1000 MCG: 1000 INJECTION INTRAMUSCULAR; SUBCUTANEOUS at 17:15:00

## 2021-01-07 ENCOUNTER — HOSPITAL ENCOUNTER (OUTPATIENT)
Dept: ULTRASOUND IMAGING | Age: 41
Discharge: HOME | End: 2021-01-07
Attending: SURGERY
Payer: COMMERCIAL

## 2021-01-07 DIAGNOSIS — N60.02 BREAST CYST, LEFT: ICD-10-CM

## 2021-01-07 PROCEDURE — 76642 ULTRASOUND BREAST LIMITED: CPT | Performed by: SURGERY

## 2021-01-14 ENCOUNTER — OFFICE VISIT (OUTPATIENT)
Dept: FAMILY MEDICINE CLINIC | Facility: CLINIC | Age: 41
End: 2021-01-14
Payer: COMMERCIAL

## 2021-01-14 DIAGNOSIS — E53.8 VITAMIN B12 DEFICIENCY: Primary | ICD-10-CM

## 2021-01-14 PROCEDURE — 96372 THER/PROPH/DIAG INJ SC/IM: CPT | Performed by: NURSE PRACTITIONER

## 2021-01-14 RX ORDER — CYANOCOBALAMIN 1000 UG/ML
1000 INJECTION INTRAMUSCULAR; SUBCUTANEOUS
COMMUNITY
Start: 2021-01-06 | End: 2021-06-23

## 2021-01-14 RX ORDER — CYANOCOBALAMIN 1000 UG/ML
1000 INJECTION INTRAMUSCULAR; SUBCUTANEOUS ONCE
Status: COMPLETED | OUTPATIENT
Start: 2021-01-14 | End: 2021-01-14

## 2021-01-14 RX ADMIN — CYANOCOBALAMIN 1000 MCG: 1000 INJECTION INTRAMUSCULAR; SUBCUTANEOUS at 16:24:00

## 2021-02-11 ENCOUNTER — OFFICE VISIT (OUTPATIENT)
Dept: FAMILY MEDICINE CLINIC | Facility: CLINIC | Age: 41
End: 2021-02-11
Payer: COMMERCIAL

## 2021-02-11 DIAGNOSIS — E53.8 VITAMIN B12 DEFICIENCY: Primary | ICD-10-CM

## 2021-02-11 PROCEDURE — 96372 THER/PROPH/DIAG INJ SC/IM: CPT | Performed by: NURSE PRACTITIONER

## 2021-02-11 RX ORDER — CYANOCOBALAMIN 1000 UG/ML
1000 INJECTION INTRAMUSCULAR; SUBCUTANEOUS ONCE
Status: COMPLETED | OUTPATIENT
Start: 2021-02-11 | End: 2021-02-11

## 2021-02-11 RX ORDER — CYANOCOBALAMIN 1000 UG/ML
1000 INJECTION INTRAMUSCULAR; SUBCUTANEOUS ONCE
Status: DISCONTINUED | OUTPATIENT
Start: 2021-02-11 | End: 2021-02-11

## 2021-02-11 RX ADMIN — CYANOCOBALAMIN 1000 MCG: 1000 INJECTION INTRAMUSCULAR; SUBCUTANEOUS at 16:51:00

## 2021-03-11 ENCOUNTER — OFFICE VISIT (OUTPATIENT)
Dept: FAMILY MEDICINE CLINIC | Facility: CLINIC | Age: 41
End: 2021-03-11
Payer: COMMERCIAL

## 2021-03-11 DIAGNOSIS — E53.8 VITAMIN B12 DEFICIENCY: Primary | ICD-10-CM

## 2021-03-11 PROCEDURE — 96372 THER/PROPH/DIAG INJ SC/IM: CPT | Performed by: NURSE PRACTITIONER

## 2021-03-11 RX ORDER — CYANOCOBALAMIN 1000 UG/ML
1000 INJECTION INTRAMUSCULAR; SUBCUTANEOUS ONCE
Status: COMPLETED | OUTPATIENT
Start: 2021-03-11 | End: 2021-03-11

## 2021-03-11 RX ADMIN — CYANOCOBALAMIN 1000 MCG: 1000 INJECTION INTRAMUSCULAR; SUBCUTANEOUS at 16:21:00

## 2021-04-08 ENCOUNTER — OFFICE VISIT (OUTPATIENT)
Dept: FAMILY MEDICINE CLINIC | Facility: CLINIC | Age: 41
End: 2021-04-08
Payer: COMMERCIAL

## 2021-04-08 DIAGNOSIS — E53.8 B12 DEFICIENCY: Primary | ICD-10-CM

## 2021-04-08 PROCEDURE — 96372 THER/PROPH/DIAG INJ SC/IM: CPT | Performed by: NURSE PRACTITIONER

## 2021-04-08 RX ORDER — CYANOCOBALAMIN 1000 UG/ML
1000 INJECTION INTRAMUSCULAR; SUBCUTANEOUS ONCE
Status: COMPLETED | OUTPATIENT
Start: 2021-04-08 | End: 2021-04-08

## 2021-04-08 RX ADMIN — CYANOCOBALAMIN 1000 MCG: 1000 INJECTION INTRAMUSCULAR; SUBCUTANEOUS at 11:06:00

## 2021-05-04 ENCOUNTER — OFFICE VISIT (OUTPATIENT)
Dept: FAMILY MEDICINE CLINIC | Facility: CLINIC | Age: 41
End: 2021-05-04
Payer: COMMERCIAL

## 2021-05-04 DIAGNOSIS — E53.8 VITAMIN B12 DEFICIENCY: Primary | ICD-10-CM

## 2021-05-04 PROCEDURE — 96372 THER/PROPH/DIAG INJ SC/IM: CPT | Performed by: NURSE PRACTITIONER

## 2021-05-04 RX ORDER — CYANOCOBALAMIN 1000 UG/ML
1000 INJECTION INTRAMUSCULAR; SUBCUTANEOUS ONCE
Status: COMPLETED | OUTPATIENT
Start: 2021-05-04 | End: 2021-05-04

## 2021-05-04 RX ADMIN — CYANOCOBALAMIN 1000 MCG: 1000 INJECTION INTRAMUSCULAR; SUBCUTANEOUS at 12:02:00

## 2021-06-10 ENCOUNTER — OFFICE VISIT (OUTPATIENT)
Dept: FAMILY MEDICINE CLINIC | Facility: CLINIC | Age: 41
End: 2021-06-10
Payer: COMMERCIAL

## 2021-06-10 DIAGNOSIS — E53.8 VITAMIN B 12 DEFICIENCY: Primary | ICD-10-CM

## 2021-06-10 PROCEDURE — 96372 THER/PROPH/DIAG INJ SC/IM: CPT | Performed by: NURSE PRACTITIONER

## 2021-06-10 PROCEDURE — 99212 OFFICE O/P EST SF 10 MIN: CPT | Performed by: NURSE PRACTITIONER

## 2021-06-10 RX ORDER — CYANOCOBALAMIN 1000 UG/ML
1000 INJECTION INTRAMUSCULAR; SUBCUTANEOUS ONCE
Status: COMPLETED | OUTPATIENT
Start: 2021-06-10 | End: 2021-06-10

## 2021-06-10 RX ADMIN — CYANOCOBALAMIN 1000 MCG: 1000 INJECTION INTRAMUSCULAR; SUBCUTANEOUS at 11:50:00

## 2021-06-23 RX ORDER — CYANOCOBALAMIN 1000 UG/ML
INJECTION INTRAMUSCULAR; SUBCUTANEOUS
Qty: 3 ML | Refills: 3 | Status: SHIPPED | OUTPATIENT
Start: 2021-06-23

## 2021-06-23 NOTE — TELEPHONE ENCOUNTER
Name from pharmacy: CYANOCOBALAMIN 1,000 MCG/ML         Will file in chart as: CYANOCOBALAMIN 1000 MCG/ML Injection Solution    Sig: INJECT 1,000 MCG AS DIRECTED EVERY 30 (THIRTY) DAYS.     Disp:  3 mL    Refills:  3    Start: 6/23/2021    Class: Normal

## 2021-06-28 DIAGNOSIS — N60.02 BREAST CYST, LEFT: Primary | ICD-10-CM

## 2021-07-06 ENCOUNTER — IMMUNIZATION (OUTPATIENT)
Dept: LAB | Facility: HOSPITAL | Age: 41
End: 2021-07-06
Attending: EMERGENCY MEDICINE
Payer: COMMERCIAL

## 2021-07-06 DIAGNOSIS — Z23 NEED FOR VACCINATION: Primary | ICD-10-CM

## 2021-07-06 PROCEDURE — 0001A SARSCOV2 VAC 30MCG/0.3ML IM: CPT

## 2021-08-05 ENCOUNTER — OFFICE VISIT (OUTPATIENT)
Dept: FAMILY MEDICINE CLINIC | Facility: CLINIC | Age: 41
End: 2021-08-05
Payer: COMMERCIAL

## 2021-08-05 DIAGNOSIS — E53.8 VITAMIN B 12 DEFICIENCY: Primary | ICD-10-CM

## 2021-08-05 PROCEDURE — 96372 THER/PROPH/DIAG INJ SC/IM: CPT | Performed by: NURSE PRACTITIONER

## 2021-08-05 RX ORDER — CYANOCOBALAMIN 1000 UG/ML
1000 INJECTION INTRAMUSCULAR; SUBCUTANEOUS ONCE
Status: COMPLETED | OUTPATIENT
Start: 2021-08-05 | End: 2021-08-05

## 2021-08-05 RX ADMIN — CYANOCOBALAMIN 1000 MCG: 1000 INJECTION INTRAMUSCULAR; SUBCUTANEOUS at 12:06:00

## 2021-09-02 ENCOUNTER — HOSPITAL ENCOUNTER (OUTPATIENT)
Dept: ULTRASOUND IMAGING | Age: 41
Discharge: HOME | End: 2021-09-02
Attending: PHYSICIAN ASSISTANT
Payer: COMMERCIAL

## 2021-09-02 ENCOUNTER — HOSPITAL ENCOUNTER (OUTPATIENT)
Dept: MAMMOGRAPHY | Age: 41
Discharge: HOME | End: 2021-09-02
Attending: PHYSICIAN ASSISTANT
Payer: COMMERCIAL

## 2021-09-02 DIAGNOSIS — N60.02 BREAST CYST, LEFT: ICD-10-CM

## 2021-09-02 PROCEDURE — 77062 BREAST TOMOSYNTHESIS BI: CPT | Performed by: PHYSICIAN ASSISTANT

## 2021-09-02 PROCEDURE — 77066 DX MAMMO INCL CAD BI: CPT | Performed by: PHYSICIAN ASSISTANT

## 2021-09-02 PROCEDURE — 76641 ULTRASOUND BREAST COMPLETE: CPT | Performed by: PHYSICIAN ASSISTANT

## 2021-09-09 DIAGNOSIS — N60.01 BILATERAL BREAST CYSTS: Primary | ICD-10-CM

## 2021-09-09 DIAGNOSIS — N60.02 BILATERAL BREAST CYSTS: Primary | ICD-10-CM

## 2021-10-15 ENCOUNTER — OFFICE VISIT (OUTPATIENT)
Dept: INTERNAL MEDICINE CLINIC | Facility: CLINIC | Age: 41
End: 2021-10-15
Payer: COMMERCIAL

## 2021-10-15 VITALS
OXYGEN SATURATION: 99 % | DIASTOLIC BLOOD PRESSURE: 54 MMHG | SYSTOLIC BLOOD PRESSURE: 96 MMHG | RESPIRATION RATE: 16 BRPM | HEART RATE: 58 BPM | HEIGHT: 67 IN | BODY MASS INDEX: 21.25 KG/M2 | WEIGHT: 135.38 LBS | TEMPERATURE: 98 F

## 2021-10-15 DIAGNOSIS — Z00.00 ENCOUNTER FOR ROUTINE ADULT MEDICAL EXAMINATION: Primary | ICD-10-CM

## 2021-10-15 DIAGNOSIS — E06.3 HASHIMOTO'S DISEASE: ICD-10-CM

## 2021-10-15 PROCEDURE — 3008F BODY MASS INDEX DOCD: CPT | Performed by: INTERNAL MEDICINE

## 2021-10-15 PROCEDURE — 3078F DIAST BP <80 MM HG: CPT | Performed by: INTERNAL MEDICINE

## 2021-10-15 PROCEDURE — 3074F SYST BP LT 130 MM HG: CPT | Performed by: INTERNAL MEDICINE

## 2021-10-15 PROCEDURE — 99396 PREV VISIT EST AGE 40-64: CPT | Performed by: INTERNAL MEDICINE

## 2021-10-15 RX ORDER — DOXYCYCLINE HYCLATE 100 MG/1
CAPSULE ORAL
COMMUNITY
Start: 2021-06-08

## 2021-10-15 RX ORDER — TRETINOIN 0.025 %
CREAM (GRAM) TOPICAL
COMMUNITY
Start: 2021-09-24

## 2021-10-15 RX ORDER — CLOBETASOL PROPIONATE 0.5 MG/G
CREAM TOPICAL
COMMUNITY
Start: 2021-09-01

## 2021-10-15 RX ORDER — CLINDAMYCIN PHOSPHATE 10 MG/ML
LOTION TOPICAL
COMMUNITY
Start: 2021-09-24

## 2021-10-15 RX ORDER — THYROID, PORCINE 60 MG/1
TABLET ORAL
COMMUNITY

## 2021-10-15 RX ORDER — SPIRONOLACTONE 50 MG/1
TABLET, FILM COATED ORAL
COMMUNITY
Start: 2021-09-01

## 2021-10-15 NOTE — PROGRESS NOTES
[facility] Internal Medicine Office Note  Chief Complaint:   Patient presents with:  Physical      HPI:   This is a 42year old female coming in for physical  HPI    She sess integrative medicine practice who manages her hypothyroidism.  On magalys Comment: soc    Drug use: No    Allergies:    Dog Fennel                Grass                     Mold                        Comment: This is an error not allergic to mold  Peanuts                   Shrimp                    Tree Extract              Wheat EXAM:   BP 96/54   Pulse 58   Temp 98 °F (36.7 °C) (Oral)   Resp 16   Ht 5' 7\" (1.702 m)   Wt 135 lb 6.4 oz (61.4 kg)   LMP 10/07/2021   SpO2 99%   BMI 21.21 kg/m²  Estimated body mass index is 21.21 kg/m² as calculated from the following:    Josh the vaccine as it's unusual for side effect symptoms to last so long.   -flu shot recommended     Hashimoto's disease -defer thyroid management to her specialist through integrative medicine.  She needs order for thyroid US so she can schedule it through Ed

## 2021-10-28 ENCOUNTER — OFFICE VISIT (OUTPATIENT)
Dept: FAMILY MEDICINE CLINIC | Facility: CLINIC | Age: 41
End: 2021-10-28
Payer: COMMERCIAL

## 2021-10-28 VITALS
TEMPERATURE: 98 F | OXYGEN SATURATION: 99 % | SYSTOLIC BLOOD PRESSURE: 116 MMHG | DIASTOLIC BLOOD PRESSURE: 64 MMHG | HEART RATE: 59 BPM | BODY MASS INDEX: 20.97 KG/M2 | HEIGHT: 67.5 IN | WEIGHT: 135.19 LBS | RESPIRATION RATE: 18 BRPM

## 2021-10-28 DIAGNOSIS — J01.40 ACUTE PANSINUSITIS, RECURRENCE NOT SPECIFIED: Primary | ICD-10-CM

## 2021-10-28 PROCEDURE — 3078F DIAST BP <80 MM HG: CPT | Performed by: NURSE PRACTITIONER

## 2021-10-28 PROCEDURE — 3074F SYST BP LT 130 MM HG: CPT | Performed by: NURSE PRACTITIONER

## 2021-10-28 PROCEDURE — 99213 OFFICE O/P EST LOW 20 MIN: CPT | Performed by: NURSE PRACTITIONER

## 2021-10-28 PROCEDURE — 3008F BODY MASS INDEX DOCD: CPT | Performed by: NURSE PRACTITIONER

## 2021-10-28 RX ORDER — AMOXICILLIN AND CLAVULANATE POTASSIUM 875; 125 MG/1; MG/1
1 TABLET, FILM COATED ORAL 2 TIMES DAILY
Qty: 14 TABLET | Refills: 0 | Status: SHIPPED | OUTPATIENT
Start: 2021-10-28 | End: 2021-11-04

## 2021-10-28 NOTE — PATIENT INSTRUCTIONS
·  PLAN: Augmentin, take as directed. Finish all the medication even if you feel better. · Probiotics or yogurt daily during antibiotic use will help decrease stomach upset and restore good bacteria to the gut.   · Continue to use allergy medication-Flona

## 2021-10-28 NOTE — PROGRESS NOTES
HPI:   Steven Chavis is a 42year old female who presents with ill symptoms for  1  weeks.  Patient reports symptoms began over one week ago with nasal congestion which improved, then sinus pressure/pressure behind eyes, headache and nasal discharge for the l Vitamin D deficiency       Past Surgical History:   Procedure Laterality Date   •       x2      Family History   Problem Relation Age of Onset   • Arthritis Father    • Other (atrial fibrillation) Father    • Other (aortic aneurysm) Father    • Gastro- risk/benefit discussed. Patient is advised to follow up with PCP if not improving within 10-14 days of onset of illness or seek immediate care if symptoms worsen. The patient indicates understanding of these issues and agrees to the plan.   Patient Instruc

## 2021-11-09 ENCOUNTER — HOSPITAL ENCOUNTER (OUTPATIENT)
Dept: ULTRASOUND IMAGING | Age: 41
Discharge: HOME | End: 2021-11-09
Attending: INTERNAL MEDICINE
Payer: COMMERCIAL

## 2021-11-09 DIAGNOSIS — E06.3 HASHIMOTO'S DISEASE: ICD-10-CM

## 2021-11-09 PROCEDURE — 76536 US EXAM OF HEAD AND NECK: CPT | Performed by: INTERNAL MEDICINE

## 2022-05-03 ENCOUNTER — HOSPITAL ENCOUNTER (OUTPATIENT)
Dept: ULTRASOUND IMAGING | Age: 42
Discharge: HOME | End: 2022-05-03
Attending: SURGERY
Payer: COMMERCIAL

## 2022-05-03 DIAGNOSIS — N60.02 BILATERAL BREAST CYSTS: ICD-10-CM

## 2022-05-03 DIAGNOSIS — N60.01 BILATERAL BREAST CYSTS: ICD-10-CM

## 2022-05-03 PROCEDURE — 76642 ULTRASOUND BREAST LIMITED: CPT | Performed by: SURGERY

## 2022-10-24 ENCOUNTER — OFFICE VISIT (OUTPATIENT)
Dept: INTERNAL MEDICINE CLINIC | Facility: CLINIC | Age: 42
End: 2022-10-24
Payer: COMMERCIAL

## 2022-10-24 VITALS
DIASTOLIC BLOOD PRESSURE: 62 MMHG | RESPIRATION RATE: 16 BRPM | TEMPERATURE: 98 F | BODY MASS INDEX: 22.29 KG/M2 | SYSTOLIC BLOOD PRESSURE: 100 MMHG | WEIGHT: 142 LBS | HEART RATE: 81 BPM | HEIGHT: 67 IN | OXYGEN SATURATION: 99 %

## 2022-10-24 DIAGNOSIS — Z00.00 ENCOUNTER FOR ROUTINE ADULT MEDICAL EXAMINATION: Primary | ICD-10-CM

## 2022-10-24 DIAGNOSIS — R53.83 FATIGUE, UNSPECIFIED TYPE: ICD-10-CM

## 2022-10-24 DIAGNOSIS — E06.3 HASHIMOTO'S DISEASE: ICD-10-CM

## 2022-10-24 DIAGNOSIS — E83.110 HEREDITARY HEMOCHROMATOSIS (HCC): ICD-10-CM

## 2022-10-24 PROCEDURE — 3074F SYST BP LT 130 MM HG: CPT | Performed by: INTERNAL MEDICINE

## 2022-10-24 PROCEDURE — 99396 PREV VISIT EST AGE 40-64: CPT | Performed by: INTERNAL MEDICINE

## 2022-10-24 PROCEDURE — 3008F BODY MASS INDEX DOCD: CPT | Performed by: INTERNAL MEDICINE

## 2022-10-24 PROCEDURE — 3078F DIAST BP <80 MM HG: CPT | Performed by: INTERNAL MEDICINE

## 2022-10-24 RX ORDER — LEVOTHYROXINE, LIOTHYRONINE 38; 9 UG/1; UG/1
TABLET ORAL
COMMUNITY
Start: 2022-10-04 | End: 2022-10-24

## 2022-10-25 ENCOUNTER — TELEPHONE (OUTPATIENT)
Dept: HEMATOLOGY/ONCOLOGY | Facility: HOSPITAL | Age: 42
End: 2022-10-25

## 2022-10-25 NOTE — TELEPHONE ENCOUNTER
Patient calling to schedule consult with Dr Ai Klein    Referred by Rossana Brannon MD  To Dr Ai Klein  Dx:   Hereditary hemochromatosis   Patient has had Genetic Testing

## 2022-10-28 ENCOUNTER — HOSPITAL ENCOUNTER (OUTPATIENT)
Dept: MAMMOGRAPHY | Age: 42
Discharge: HOME | End: 2022-10-28
Attending: OBSTETRICS & GYNECOLOGY
Payer: COMMERCIAL

## 2022-10-28 ENCOUNTER — HOSPITAL ENCOUNTER (OUTPATIENT)
Dept: ULTRASOUND IMAGING | Age: 42
Discharge: HOME | End: 2022-10-28
Attending: OBSTETRICS & GYNECOLOGY
Payer: COMMERCIAL

## 2022-10-28 DIAGNOSIS — R92.8 ABNORMAL MAMMOGRAM: ICD-10-CM

## 2022-10-28 PROCEDURE — 76642 ULTRASOUND BREAST LIMITED: CPT | Performed by: OBSTETRICS & GYNECOLOGY

## 2022-10-28 PROCEDURE — 77066 DX MAMMO INCL CAD BI: CPT | Performed by: OBSTETRICS & GYNECOLOGY

## 2022-10-28 PROCEDURE — 77062 BREAST TOMOSYNTHESIS BI: CPT | Performed by: OBSTETRICS & GYNECOLOGY

## 2022-11-28 ENCOUNTER — OFFICE VISIT (OUTPATIENT)
Dept: HEMATOLOGY/ONCOLOGY | Age: 42
End: 2022-11-28
Attending: INTERNAL MEDICINE
Payer: COMMERCIAL

## 2022-11-28 VITALS
HEIGHT: 67.01 IN | WEIGHT: 147.38 LBS | SYSTOLIC BLOOD PRESSURE: 115 MMHG | HEART RATE: 71 BPM | DIASTOLIC BLOOD PRESSURE: 78 MMHG | OXYGEN SATURATION: 99 % | TEMPERATURE: 99 F | BODY MASS INDEX: 23.13 KG/M2

## 2022-11-28 DIAGNOSIS — E83.19 IRON OVERLOAD: Primary | ICD-10-CM

## 2022-11-28 DIAGNOSIS — E83.119 HEMOCHROMATOSIS, UNSPECIFIED HEMOCHROMATOSIS TYPE: ICD-10-CM

## 2022-11-28 PROCEDURE — 99204 OFFICE O/P NEW MOD 45 MIN: CPT | Performed by: INTERNAL MEDICINE

## 2022-11-28 RX ORDER — VIT C/HESPERIDIN/BIOFLAVONOIDS 500-100 MG
TABLET ORAL
COMMUNITY

## 2023-04-11 ENCOUNTER — NURSE ONLY (OUTPATIENT)
Dept: HEMATOLOGY/ONCOLOGY | Age: 43
End: 2023-04-11
Attending: INTERNAL MEDICINE
Payer: COMMERCIAL

## 2023-04-11 DIAGNOSIS — E83.119 HEMOCHROMATOSIS, UNSPECIFIED HEMOCHROMATOSIS TYPE: ICD-10-CM

## 2023-04-11 DIAGNOSIS — E83.19 IRON OVERLOAD: ICD-10-CM

## 2023-04-11 LAB
AFP-TM SERPL-MCNC: 1 NG/ML (ref ?–8)
ALBUMIN SERPL-MCNC: 4.1 G/DL (ref 3.4–5)
ALBUMIN/GLOB SERPL: 1.2 {RATIO} (ref 1–2)
ALP LIVER SERPL-CCNC: 65 U/L
ALT SERPL-CCNC: 25 U/L
ANION GAP SERPL CALC-SCNC: 8 MMOL/L (ref 0–18)
AST SERPL-CCNC: 17 U/L (ref 15–37)
BASOPHILS # BLD AUTO: 0.02 X10(3) UL (ref 0–0.2)
BASOPHILS NFR BLD AUTO: 0.4 %
BILIRUB SERPL-MCNC: 0.4 MG/DL (ref 0.1–2)
BUN BLD-MCNC: 13 MG/DL (ref 7–18)
CALCIUM BLD-MCNC: 9.2 MG/DL (ref 8.5–10.1)
CHLORIDE SERPL-SCNC: 108 MMOL/L (ref 98–112)
CO2 SERPL-SCNC: 21 MMOL/L (ref 21–32)
CREAT BLD-MCNC: 0.71 MG/DL
DEPRECATED HBV CORE AB SER IA-ACNC: 19.4 NG/ML
EOSINOPHIL # BLD AUTO: 0.14 X10(3) UL (ref 0–0.7)
EOSINOPHIL NFR BLD AUTO: 2.5 %
ERYTHROCYTE [DISTWIDTH] IN BLOOD BY AUTOMATED COUNT: 12.6 %
FASTING STATUS PATIENT QL REPORTED: YES
GFR SERPLBLD BASED ON 1.73 SQ M-ARVRAT: 108 ML/MIN/1.73M2 (ref 60–?)
GLOBULIN PLAS-MCNC: 3.3 G/DL (ref 2.8–4.4)
GLUCOSE BLD-MCNC: 105 MG/DL (ref 70–99)
HCT VFR BLD AUTO: 42.3 %
HGB BLD-MCNC: 14.2 G/DL
IMM GRANULOCYTES # BLD AUTO: 0.01 X10(3) UL (ref 0–1)
IMM GRANULOCYTES NFR BLD: 0.2 %
IRON SATN MFR SERPL: 28 %
IRON SERPL-MCNC: 101 UG/DL
LYMPHOCYTES # BLD AUTO: 1.86 X10(3) UL (ref 1–4)
LYMPHOCYTES NFR BLD AUTO: 33.3 %
MCH RBC QN AUTO: 28.2 PG (ref 26–34)
MCHC RBC AUTO-ENTMCNC: 33.6 G/DL (ref 31–37)
MCV RBC AUTO: 84.1 FL
MONOCYTES # BLD AUTO: 0.48 X10(3) UL (ref 0.1–1)
MONOCYTES NFR BLD AUTO: 8.6 %
NEUTROPHILS # BLD AUTO: 3.07 X10 (3) UL (ref 1.5–7.7)
NEUTROPHILS # BLD AUTO: 3.07 X10(3) UL (ref 1.5–7.7)
NEUTROPHILS NFR BLD AUTO: 55 %
OSMOLALITY SERPL CALC.SUM OF ELEC: 284 MOSM/KG (ref 275–295)
PLATELET # BLD AUTO: 237 10(3)UL (ref 150–450)
POTASSIUM SERPL-SCNC: 3.9 MMOL/L (ref 3.5–5.1)
PROT SERPL-MCNC: 7.4 G/DL (ref 6.4–8.2)
RBC # BLD AUTO: 5.03 X10(6)UL
SODIUM SERPL-SCNC: 137 MMOL/L (ref 136–145)
TIBC SERPL-MCNC: 365 UG/DL (ref 240–450)
TRANSFERRIN SERPL-MCNC: 245 MG/DL (ref 200–360)
WBC # BLD AUTO: 5.6 X10(3) UL (ref 4–11)

## 2023-04-11 PROCEDURE — 80053 COMPREHEN METABOLIC PANEL: CPT

## 2023-04-11 PROCEDURE — 85025 COMPLETE CBC W/AUTO DIFF WBC: CPT

## 2023-04-11 PROCEDURE — 82728 ASSAY OF FERRITIN: CPT

## 2023-04-11 PROCEDURE — 82105 ALPHA-FETOPROTEIN SERUM: CPT

## 2023-04-11 PROCEDURE — 83540 ASSAY OF IRON: CPT

## 2023-04-11 PROCEDURE — 36415 COLL VENOUS BLD VENIPUNCTURE: CPT

## 2023-04-11 PROCEDURE — 83550 IRON BINDING TEST: CPT

## 2023-04-17 ENCOUNTER — OFFICE VISIT (OUTPATIENT)
Dept: HEMATOLOGY/ONCOLOGY | Age: 43
End: 2023-04-17
Attending: INTERNAL MEDICINE
Payer: COMMERCIAL

## 2023-04-17 VITALS
DIASTOLIC BLOOD PRESSURE: 85 MMHG | RESPIRATION RATE: 18 BRPM | BODY MASS INDEX: 23.18 KG/M2 | HEIGHT: 67.01 IN | OXYGEN SATURATION: 100 % | WEIGHT: 147.69 LBS | TEMPERATURE: 98 F | SYSTOLIC BLOOD PRESSURE: 146 MMHG | HEART RATE: 67 BPM

## 2023-04-17 DIAGNOSIS — E83.19 IRON OVERLOAD: ICD-10-CM

## 2023-04-17 DIAGNOSIS — E83.119 HEMOCHROMATOSIS, UNSPECIFIED HEMOCHROMATOSIS TYPE: Primary | ICD-10-CM

## 2023-04-17 PROCEDURE — 99214 OFFICE O/P EST MOD 30 MIN: CPT | Performed by: INTERNAL MEDICINE

## 2023-04-17 RX ORDER — LEVOTHYROXINE, LIOTHYRONINE 38; 9 UG/1; UG/1
TABLET ORAL
COMMUNITY
Start: 2023-03-28

## 2023-04-17 NOTE — PROGRESS NOTES
Education Record    Learner:  Patient    Disease / Diagnosis: iron overload    Barriers / Limitations:  None   Comments:    Method:  Discussion   Comments:    General Topics:  Plan of care reviewed   Comments:    Outcome:  Shows understanding   Comments:  Pt did have 3 phlebotomies done since last visit. Nov/ Jan/ feb . Last one did make her fatigue for about a week.

## 2023-10-17 ENCOUNTER — NURSE ONLY (OUTPATIENT)
Dept: HEMATOLOGY/ONCOLOGY | Age: 43
End: 2023-10-17
Attending: INTERNAL MEDICINE
Payer: COMMERCIAL

## 2023-10-17 DIAGNOSIS — E83.119 HEMOCHROMATOSIS, UNSPECIFIED HEMOCHROMATOSIS TYPE: ICD-10-CM

## 2023-10-17 LAB
AFP-TM SERPL-MCNC: 0.9 NG/ML (ref ?–8)
ALBUMIN SERPL-MCNC: 3.8 G/DL (ref 3.4–5)
ALBUMIN/GLOB SERPL: 1.1 {RATIO} (ref 1–2)
ALP LIVER SERPL-CCNC: 82 U/L
ALT SERPL-CCNC: 18 U/L
ANION GAP SERPL CALC-SCNC: 4 MMOL/L (ref 0–18)
AST SERPL-CCNC: 12 U/L (ref 15–37)
BASOPHILS # BLD AUTO: 0.03 X10(3) UL (ref 0–0.2)
BASOPHILS NFR BLD AUTO: 0.6 %
BILIRUB SERPL-MCNC: 0.4 MG/DL (ref 0.1–2)
BUN BLD-MCNC: 10 MG/DL (ref 7–18)
CALCIUM BLD-MCNC: 8.9 MG/DL (ref 8.5–10.1)
CHLORIDE SERPL-SCNC: 110 MMOL/L (ref 98–112)
CO2 SERPL-SCNC: 24 MMOL/L (ref 21–32)
CREAT BLD-MCNC: 0.72 MG/DL
DEPRECATED HBV CORE AB SER IA-ACNC: 12.7 NG/ML
EGFRCR SERPLBLD CKD-EPI 2021: 106 ML/MIN/1.73M2 (ref 60–?)
EOSINOPHIL # BLD AUTO: 0.17 X10(3) UL (ref 0–0.7)
EOSINOPHIL NFR BLD AUTO: 3.2 %
ERYTHROCYTE [DISTWIDTH] IN BLOOD BY AUTOMATED COUNT: 12.5 %
GLOBULIN PLAS-MCNC: 3.5 G/DL (ref 2.8–4.4)
GLUCOSE BLD-MCNC: 99 MG/DL (ref 70–99)
HCT VFR BLD AUTO: 39.7 %
HGB BLD-MCNC: 13.2 G/DL
IMM GRANULOCYTES # BLD AUTO: 0.01 X10(3) UL (ref 0–1)
IMM GRANULOCYTES NFR BLD: 0.2 %
IRON SATN MFR SERPL: 25 %
IRON SERPL-MCNC: 83 UG/DL
LYMPHOCYTES # BLD AUTO: 1.63 X10(3) UL (ref 1–4)
LYMPHOCYTES NFR BLD AUTO: 31 %
MCH RBC QN AUTO: 28.8 PG (ref 26–34)
MCHC RBC AUTO-ENTMCNC: 33.2 G/DL (ref 31–37)
MCV RBC AUTO: 86.7 FL
MONOCYTES # BLD AUTO: 0.33 X10(3) UL (ref 0.1–1)
MONOCYTES NFR BLD AUTO: 6.3 %
NEUTROPHILS # BLD AUTO: 3.08 X10 (3) UL (ref 1.5–7.7)
NEUTROPHILS # BLD AUTO: 3.08 X10(3) UL (ref 1.5–7.7)
NEUTROPHILS NFR BLD AUTO: 58.7 %
OSMOLALITY SERPL CALC.SUM OF ELEC: 285 MOSM/KG (ref 275–295)
PLATELET # BLD AUTO: 280 10(3)UL (ref 150–450)
POTASSIUM SERPL-SCNC: 3.8 MMOL/L (ref 3.5–5.1)
PROT SERPL-MCNC: 7.3 G/DL (ref 6.4–8.2)
RBC # BLD AUTO: 4.58 X10(6)UL
SODIUM SERPL-SCNC: 138 MMOL/L (ref 136–145)
TIBC SERPL-MCNC: 338 UG/DL (ref 240–450)
TRANSFERRIN SERPL-MCNC: 227 MG/DL (ref 200–360)
WBC # BLD AUTO: 5.3 X10(3) UL (ref 4–11)

## 2023-10-17 PROCEDURE — 83540 ASSAY OF IRON: CPT

## 2023-10-17 PROCEDURE — 85025 COMPLETE CBC W/AUTO DIFF WBC: CPT

## 2023-10-17 PROCEDURE — 83550 IRON BINDING TEST: CPT

## 2023-10-17 PROCEDURE — 82728 ASSAY OF FERRITIN: CPT

## 2023-10-17 PROCEDURE — 36415 COLL VENOUS BLD VENIPUNCTURE: CPT

## 2023-10-17 PROCEDURE — 80053 COMPREHEN METABOLIC PANEL: CPT

## 2023-10-17 PROCEDURE — 82105 ALPHA-FETOPROTEIN SERUM: CPT

## 2023-10-17 NOTE — PROGRESS NOTES
Patient presents for monthly B12 injection. No complications. Nsaids Counseling: NSAID Counseling: I discussed with the patient that NSAIDs should be taken with food. Prolonged use of NSAIDs can result in the development of stomach ulcers.  Patient advised to stop taking NSAIDs if abdominal pain occurs.  The patient verbalized understanding of the proper use and possible adverse effects of NSAIDs.  All of the patient's questions and concerns were addressed.

## 2023-10-18 ENCOUNTER — TELEPHONE (OUTPATIENT)
Dept: HEMATOLOGY/ONCOLOGY | Facility: HOSPITAL | Age: 43
End: 2023-10-18

## 2023-10-18 NOTE — TELEPHONE ENCOUNTER
Contacted patient, regarding orders for phlebotomy at [facility]. [email or web address] patient services phone number is [phone], for order to continue every 3 months.

## 2023-10-19 NOTE — TELEPHONE ENCOUNTER
[facility] patient services contacted. Verified I have the most updated order form, will be going online in the new year. Will faxed signed order for pt phlebotomy.

## 2023-11-09 ENCOUNTER — HOSPITAL ENCOUNTER (OUTPATIENT)
Dept: ULTRASOUND IMAGING | Age: 43
Discharge: HOME | End: 2023-11-09
Attending: OBSTETRICS & GYNECOLOGY
Payer: COMMERCIAL

## 2023-11-09 ENCOUNTER — HOSPITAL ENCOUNTER (OUTPATIENT)
Dept: MAMMOGRAPHY | Age: 43
Discharge: HOME | End: 2023-11-09
Attending: OBSTETRICS & GYNECOLOGY
Payer: COMMERCIAL

## 2023-11-09 DIAGNOSIS — R92.8 ABNORMAL MAMMOGRAPHY: ICD-10-CM

## 2023-11-09 PROCEDURE — 77066 DX MAMMO INCL CAD BI: CPT | Performed by: OBSTETRICS & GYNECOLOGY

## 2023-11-09 PROCEDURE — 77062 BREAST TOMOSYNTHESIS BI: CPT | Performed by: OBSTETRICS & GYNECOLOGY

## 2023-11-09 PROCEDURE — 76641 ULTRASOUND BREAST COMPLETE: CPT | Performed by: OBSTETRICS & GYNECOLOGY

## 2023-11-29 ENCOUNTER — OFFICE VISIT (OUTPATIENT)
Dept: INTERNAL MEDICINE CLINIC | Facility: CLINIC | Age: 43
End: 2023-11-29
Payer: COMMERCIAL

## 2023-11-29 VITALS
TEMPERATURE: 98 F | HEIGHT: 67 IN | WEIGHT: 147.63 LBS | OXYGEN SATURATION: 99 % | BODY MASS INDEX: 23.17 KG/M2 | SYSTOLIC BLOOD PRESSURE: 116 MMHG | RESPIRATION RATE: 16 BRPM | HEART RATE: 66 BPM | DIASTOLIC BLOOD PRESSURE: 76 MMHG

## 2023-11-29 DIAGNOSIS — E83.110 HEREDITARY HEMOCHROMATOSIS (HCC): ICD-10-CM

## 2023-11-29 DIAGNOSIS — E06.3 HASHIMOTO'S DISEASE: ICD-10-CM

## 2023-11-29 DIAGNOSIS — Z00.00 ENCOUNTER FOR ROUTINE ADULT MEDICAL EXAMINATION: Primary | ICD-10-CM

## 2023-11-29 DIAGNOSIS — Z00.00 LABORATORY EXAM ORDERED AS PART OF ROUTINE GENERAL MEDICAL EXAMINATION: ICD-10-CM

## 2023-11-29 PROCEDURE — 3078F DIAST BP <80 MM HG: CPT | Performed by: INTERNAL MEDICINE

## 2023-11-29 PROCEDURE — 3008F BODY MASS INDEX DOCD: CPT | Performed by: INTERNAL MEDICINE

## 2023-11-29 PROCEDURE — 3074F SYST BP LT 130 MM HG: CPT | Performed by: INTERNAL MEDICINE

## 2023-11-29 PROCEDURE — 99396 PREV VISIT EST AGE 40-64: CPT | Performed by: INTERNAL MEDICINE

## 2024-01-10 ENCOUNTER — TELEPHONE (OUTPATIENT)
Dept: HEMATOLOGY/ONCOLOGY | Facility: HOSPITAL | Age: 44
End: 2024-01-10

## 2024-04-14 NOTE — PROGRESS NOTES
[facility] Progress Note      Patient Name:  Karen De Jesus  YOB: 1980  Medical Record Number:  [id number]    Date of visit:  4/15/2024      CHIEF COMPLAINT: Hemochromatosis.    HPI:     43 year old that I have followed since 11/22 after she diag with  hemochromatosis with a heterozygous C282Y and a heterozygous H63D mutation.  IUD last 2 years, menses have been lighter.  Phlebotomy done monthly x3.  Presents for evaluation.    Phlebotomy q 3 months, last 11/23.  Menses bit worse.     SOCIAL HISTORY:    , 2 daughters aged 12 and 14.  Has a twin sister.    ROS:     Constitutional: no fever, chills fatigue,night sweats or weight loss  Neurologic: no headache, seizures, diplopia or weakness  Respiratory: no cough, SOB or hemoptysis  Cardiovascular: no chest pain, ankle swelling, BARR  GI: no nausea, vomiting, diarrhea or BRBPR  Musculoskeletal: no body-ache or joint pain  Dermatologic: no alopecia, rash, pruritis  : no hematuria, dysuria or frequency  Psych: no confusion or depression   Heme: no easy bruising or bleeding     ALLERGIES:    Allergies   Allergen Reactions    Dog Fennel     Grass     Mold      This is an error not allergic to mold      Peanuts     Shrimp     Tree Extract     Wheat Gluten     Yeast-Related        MEDICATIONS:    Current Outpatient Medications   Medication Sig Dispense Refill    NP THYROID 60 MG Oral Tab Take 1 tablet (60 mg total) by mouth daily.      Zinc 30 MG Oral Tab Take by mouth.      cholecalciferol 1.25 MG (34273 UT) Oral Cap Take 1 capsule (50,000 Units total) by mouth once a week.      CYANOCOBALAMIN 1000 MCG/ML Injection Solution INJECT 1,000 MCG AS DIRECTED EVERY 30 (THIRTY) DAYS. 3 mL 3    LIOTHYRONINE SODIUM 5 MCG Oral Tab Take 2 tablets by mouth daily on an empty stomach (Patient taking differently: Take 1 tablet (5 mcg total) by mouth daily.) 180 tablet 0    EPINEPHrine 0.3 MG/0.3ML Injection Solution Auto-injector       levonorgestrel 20  MCG/24HR Intrauterine IUD 20 mcg (1 each total) by Intrauterine route once.      MAGNESIUM GLYCINATE PLUS OR Take by mouth.         VITALS:  Height: 170.2 cm (5' 7.01\") (04/15 1106)  Weight: 68 kg (150 lb) (04/15 1106)  BSA (Calculated - sq m): 1.79 sq meters (04/15 1106)  Pulse: 69 (04/15 1106)  BP: 144/82 (04/15 1106)  Temp: 98 °F (36.7 °C) (04/15 1106)  Do Not Use - Resp Rate: --  SpO2: 99 % (04/15 1106)    PS:  ECO    PHYSICAL EXAM:    General: alert and oriented x 3, not in acute distress.  HEENT: TU, oropharynx  clear.  Neck: supple.  No JVD /adenopathy.  CVS: S1S2, regular  Rhythm, no murmurs.   Lungs: Clear to auscultation and percussion.  Abdomen: Soft, non tender, no hepato-splenomegaly.  Extremities:  No edema.  CNS: no focal deficit    Emotional well being: Patient's emotional well being was assessed.  No issues requiring acute psychosocial intervention were identified.     LABS:     Recent Results (from the past 24 hour(s))   Comp Metabolic Panel (14)    Collection Time: 04/15/24 11:06 AM   Result Value Ref Range    Glucose 101 (H) 70 - 99 mg/dL    Sodium 138 136 - 145 mmol/L    Potassium 3.9 3.5 - 5.1 mmol/L    Chloride 106 98 - 112 mmol/L    CO2 27.0 21.0 - 32.0 mmol/L    Anion Gap 5 0 - 18 mmol/L    BUN 7 (L) 9 - 23 mg/dL    Creatinine 0.73 0.55 - 1.02 mg/dL    Calcium, Total 9.3 8.5 - 10.1 mg/dL    Calculated Osmolality 284 275 - 295 mOsm/kg    eGFR-Cr 104 >=60 mL/min/1.73m2    AST 16 15 - 37 U/L    ALT 16 13 - 56 U/L    Alkaline Phosphatase 81 37 - 98 U/L    Bilirubin, Total 0.6 0.1 - 2.0 mg/dL    Total Protein 8.0 6.4 - 8.2 g/dL    Albumin 4.2 3.4 - 5.0 g/dL    Globulin  3.8 2.8 - 4.4 g/dL    A/G Ratio 1.1 1.0 - 2.0    Patient Fasting for CMP? Patient not present    CBC W/ DIFFERENTIAL    Collection Time: 04/15/24 11:06 AM   Result Value Ref Range    WBC 5.9 4.0 - 11.0 x10(3) uL    RBC 5.31 (H) 3.80 - 5.30 x10(6)uL    HGB 15.0 12.0 - 16.0 g/dL    HCT 45.1 35.0 - 48.0 %    .0 150.0 -  450.0 10(3)uL    MCV 84.9 80.0 - 100.0 fL    MCH 28.2 26.0 - 34.0 pg    MCHC 33.3 31.0 - 37.0 g/dL    RDW 13.2 %    Neutrophil Absolute Prelim 3.46 1.50 - 7.70 x10 (3) uL    Neutrophil Absolute 3.46 1.50 - 7.70 x10(3) uL    Lymphocyte Absolute 1.80 1.00 - 4.00 x10(3) uL    Monocyte Absolute 0.41 0.10 - 1.00 x10(3) uL    Eosinophil Absolute 0.14 0.00 - 0.70 x10(3) uL    Basophil Absolute 0.02 0.00 - 0.20 x10(3) uL    Immature Granulocyte Absolute 0.02 0.00 - 1.00 x10(3) uL    Neutrophil % 59.2 %    Lymphocyte % 30.8 %    Monocyte % 7.0 %    Eosinophil % 2.4 %    Basophil % 0.3 %    Immature Granulocyte % 0.3 %       ASSESSMENT AND PLAN:      # Hereditary hemochromatosis: Diag 9/22.  Heterozygous C282Y and heterozygous H63D mutation.  Last phlebotomy 11/23.  Will see what her labs look like today before recommending phlebotomy frequency.      ORDERS PLACED:        Procedures    CBC With Differential With Platelet    Comp Metabolic Panel (14)    Ferritin    Iron And Tibc    AFP, TUMOR MARKER, SERUM [E]    CBC With Differential With Platelet    Comp Metabolic Panel (14)    Ferritin    Iron And Tibc       Return for Labs 6 months. MD Shiva 12 months.    [place] M.D.    [facility]    [address] [place], [address]    4/15/2024

## 2024-04-15 ENCOUNTER — OFFICE VISIT (OUTPATIENT)
Dept: HEMATOLOGY/ONCOLOGY | Age: 44
End: 2024-04-15
Attending: INTERNAL MEDICINE
Payer: COMMERCIAL

## 2024-04-15 VITALS
BODY MASS INDEX: 23.54 KG/M2 | DIASTOLIC BLOOD PRESSURE: 82 MMHG | OXYGEN SATURATION: 99 % | WEIGHT: 150 LBS | SYSTOLIC BLOOD PRESSURE: 144 MMHG | TEMPERATURE: 98 F | HEART RATE: 69 BPM | HEIGHT: 67.01 IN | RESPIRATION RATE: 18 BRPM

## 2024-04-15 DIAGNOSIS — E83.119 HEMOCHROMATOSIS, UNSPECIFIED HEMOCHROMATOSIS TYPE: ICD-10-CM

## 2024-04-15 DIAGNOSIS — E83.19 IRON OVERLOAD: Primary | ICD-10-CM

## 2024-04-15 LAB
ALBUMIN SERPL-MCNC: 4.2 G/DL (ref 3.4–5)
ALBUMIN/GLOB SERPL: 1.1 {RATIO} (ref 1–2)
ALP LIVER SERPL-CCNC: 81 U/L
ALT SERPL-CCNC: 16 U/L
ANION GAP SERPL CALC-SCNC: 5 MMOL/L (ref 0–18)
AST SERPL-CCNC: 16 U/L (ref 15–37)
BASOPHILS # BLD AUTO: 0.02 X10(3) UL (ref 0–0.2)
BASOPHILS NFR BLD AUTO: 0.3 %
BILIRUB SERPL-MCNC: 0.6 MG/DL (ref 0.1–2)
BUN BLD-MCNC: 7 MG/DL (ref 9–23)
CALCIUM BLD-MCNC: 9.3 MG/DL (ref 8.5–10.1)
CHLORIDE SERPL-SCNC: 106 MMOL/L (ref 98–112)
CO2 SERPL-SCNC: 27 MMOL/L (ref 21–32)
CREAT BLD-MCNC: 0.73 MG/DL
DEPRECATED HBV CORE AB SER IA-ACNC: 19.2 NG/ML
EGFRCR SERPLBLD CKD-EPI 2021: 104 ML/MIN/1.73M2 (ref 60–?)
EOSINOPHIL # BLD AUTO: 0.14 X10(3) UL (ref 0–0.7)
EOSINOPHIL NFR BLD AUTO: 2.4 %
ERYTHROCYTE [DISTWIDTH] IN BLOOD BY AUTOMATED COUNT: 13.2 %
GLOBULIN PLAS-MCNC: 3.8 G/DL (ref 2.8–4.4)
GLUCOSE BLD-MCNC: 101 MG/DL (ref 70–99)
HCT VFR BLD AUTO: 45.1 %
HGB BLD-MCNC: 15 G/DL
IMM GRANULOCYTES # BLD AUTO: 0.02 X10(3) UL (ref 0–1)
IMM GRANULOCYTES NFR BLD: 0.3 %
IRON SATN MFR SERPL: 45 %
IRON SERPL-MCNC: 161 UG/DL
LYMPHOCYTES # BLD AUTO: 1.8 X10(3) UL (ref 1–4)
LYMPHOCYTES NFR BLD AUTO: 30.8 %
MCH RBC QN AUTO: 28.2 PG (ref 26–34)
MCHC RBC AUTO-ENTMCNC: 33.3 G/DL (ref 31–37)
MCV RBC AUTO: 84.9 FL
MONOCYTES # BLD AUTO: 0.41 X10(3) UL (ref 0.1–1)
MONOCYTES NFR BLD AUTO: 7 %
NEUTROPHILS # BLD AUTO: 3.46 X10 (3) UL (ref 1.5–7.7)
NEUTROPHILS # BLD AUTO: 3.46 X10(3) UL (ref 1.5–7.7)
NEUTROPHILS NFR BLD AUTO: 59.2 %
OSMOLALITY SERPL CALC.SUM OF ELEC: 284 MOSM/KG (ref 275–295)
PLATELET # BLD AUTO: 281 10(3)UL (ref 150–450)
POTASSIUM SERPL-SCNC: 3.9 MMOL/L (ref 3.5–5.1)
PROT SERPL-MCNC: 8 G/DL (ref 6.4–8.2)
RBC # BLD AUTO: 5.31 X10(6)UL
SODIUM SERPL-SCNC: 138 MMOL/L (ref 136–145)
TIBC SERPL-MCNC: 358 UG/DL (ref 240–450)
TRANSFERRIN SERPL-MCNC: 240 MG/DL (ref 200–360)
WBC # BLD AUTO: 5.9 X10(3) UL (ref 4–11)

## 2024-04-15 PROCEDURE — 99214 OFFICE O/P EST MOD 30 MIN: CPT | Performed by: INTERNAL MEDICINE

## 2024-04-15 NOTE — PROGRESS NOTES
Education Record    Learner:  Patient    Disease / Diagnosis: hemochromatosis     Barriers / Limitations:  None   Comments:    Method:  Discussion   Comments:    General Topics:  Plan of care reviewed   Comments:    Outcome:  Shows understanding   Comments:   Pt last donation was 11/2023.   Missed last one due to scheduling.   Reports her periods have been heavier last 6 months or so.   Seeing gyne soon, IUD change is due.   Feeling well.

## 2024-10-15 ENCOUNTER — NURSE ONLY (OUTPATIENT)
Dept: HEMATOLOGY/ONCOLOGY | Age: 44
End: 2024-10-15
Attending: INTERNAL MEDICINE
Payer: COMMERCIAL

## 2024-10-15 DIAGNOSIS — E83.19 IRON OVERLOAD: ICD-10-CM

## 2024-10-15 DIAGNOSIS — E83.119 HEMOCHROMATOSIS, UNSPECIFIED HEMOCHROMATOSIS TYPE: ICD-10-CM

## 2024-10-15 LAB
AFP-TM SERPL-MCNC: <2.2 NG/ML
ALBUMIN SERPL-MCNC: 3.9 G/DL (ref 3.4–5)
ALBUMIN/GLOB SERPL: 1.1 {RATIO} (ref 1–2)
ALP LIVER SERPL-CCNC: 74 U/L
ALT SERPL-CCNC: 15 U/L
ANION GAP SERPL CALC-SCNC: 4 MMOL/L (ref 0–18)
AST SERPL-CCNC: 14 U/L (ref 15–37)
BASOPHILS # BLD AUTO: 0.02 X10(3) UL (ref 0–0.2)
BASOPHILS NFR BLD AUTO: 0.3 %
BILIRUB SERPL-MCNC: 0.4 MG/DL (ref 0.1–2)
BUN BLD-MCNC: 10 MG/DL (ref 9–23)
CALCIUM BLD-MCNC: 9 MG/DL (ref 8.5–10.1)
CHLORIDE SERPL-SCNC: 107 MMOL/L (ref 98–112)
CO2 SERPL-SCNC: 28 MMOL/L (ref 21–32)
CREAT BLD-MCNC: 0.64 MG/DL
DEPRECATED HBV CORE AB SER IA-ACNC: 6 NG/ML
EGFRCR SERPLBLD CKD-EPI 2021: 111 ML/MIN/1.73M2 (ref 60–?)
EOSINOPHIL # BLD AUTO: 0.23 X10(3) UL (ref 0–0.7)
EOSINOPHIL NFR BLD AUTO: 3.7 %
ERYTHROCYTE [DISTWIDTH] IN BLOOD BY AUTOMATED COUNT: 13.1 %
FASTING STATUS PATIENT QL REPORTED: NO
GLOBULIN PLAS-MCNC: 3.6 G/DL (ref 2.8–4.4)
GLUCOSE BLD-MCNC: 100 MG/DL (ref 70–99)
HCT VFR BLD AUTO: 39.4 %
HGB BLD-MCNC: 13.1 G/DL
IMM GRANULOCYTES # BLD AUTO: 0.01 X10(3) UL (ref 0–1)
IMM GRANULOCYTES NFR BLD: 0.2 %
IRON SATN MFR SERPL: 10 %
IRON SERPL-MCNC: 33 UG/DL
LYMPHOCYTES # BLD AUTO: 1.75 X10(3) UL (ref 1–4)
LYMPHOCYTES NFR BLD AUTO: 28 %
MCH RBC QN AUTO: 28.9 PG (ref 26–34)
MCHC RBC AUTO-ENTMCNC: 33.2 G/DL (ref 31–37)
MCV RBC AUTO: 86.8 FL
MONOCYTES # BLD AUTO: 0.39 X10(3) UL (ref 0.1–1)
MONOCYTES NFR BLD AUTO: 6.3 %
NEUTROPHILS # BLD AUTO: 3.84 X10 (3) UL (ref 1.5–7.7)
NEUTROPHILS # BLD AUTO: 3.84 X10(3) UL (ref 1.5–7.7)
NEUTROPHILS NFR BLD AUTO: 61.5 %
OSMOLALITY SERPL CALC.SUM OF ELEC: 287 MOSM/KG (ref 275–295)
PLATELET # BLD AUTO: 274 10(3)UL (ref 150–450)
POTASSIUM SERPL-SCNC: 4.1 MMOL/L (ref 3.5–5.1)
PROT SERPL-MCNC: 7.5 G/DL (ref 6.4–8.2)
RBC # BLD AUTO: 4.54 X10(6)UL
SODIUM SERPL-SCNC: 139 MMOL/L (ref 136–145)
TOTAL IRON BINDING CAPACITY: 335 UG/DL (ref 250–425)
TRANSFERRIN SERPL-MCNC: 280 MG/DL (ref 250–380)
WBC # BLD AUTO: 6.2 X10(3) UL (ref 4–11)

## 2024-10-15 PROCEDURE — 83550 IRON BINDING TEST: CPT

## 2024-10-15 PROCEDURE — 36415 COLL VENOUS BLD VENIPUNCTURE: CPT

## 2024-10-15 PROCEDURE — 80053 COMPREHEN METABOLIC PANEL: CPT

## 2024-10-15 PROCEDURE — 82105 ALPHA-FETOPROTEIN SERUM: CPT

## 2024-10-15 PROCEDURE — 85025 COMPLETE CBC W/AUTO DIFF WBC: CPT

## 2024-10-15 PROCEDURE — 83540 ASSAY OF IRON: CPT

## 2024-10-15 PROCEDURE — 82728 ASSAY OF FERRITIN: CPT

## 2024-10-16 ENCOUNTER — TELEPHONE (OUTPATIENT)
Dept: HEMATOLOGY/ONCOLOGY | Facility: HOSPITAL | Age: 44
End: 2024-10-16

## 2024-12-03 ENCOUNTER — HOSPITAL ENCOUNTER (OUTPATIENT)
Dept: MAMMOGRAPHY | Age: 44
Discharge: HOME | End: 2024-12-03
Attending: OBSTETRICS & GYNECOLOGY
Payer: COMMERCIAL

## 2024-12-03 DIAGNOSIS — Z12.31 ENCOUNTER FOR SCREENING MAMMOGRAM FOR MALIGNANT NEOPLASM OF BREAST: ICD-10-CM

## 2024-12-03 PROCEDURE — 77063 BREAST TOMOSYNTHESIS BI: CPT | Performed by: OBSTETRICS & GYNECOLOGY

## 2024-12-03 PROCEDURE — 77067 SCR MAMMO BI INCL CAD: CPT | Performed by: OBSTETRICS & GYNECOLOGY

## 2024-12-12 ENCOUNTER — HOSPITAL ENCOUNTER (OUTPATIENT)
Dept: MAMMOGRAPHY | Facility: HOSPITAL | Age: 44
Discharge: HOME | End: 2024-12-12
Attending: OBSTETRICS & GYNECOLOGY
Payer: COMMERCIAL

## 2024-12-12 DIAGNOSIS — R92.30 BREAST DENSITY: ICD-10-CM

## 2024-12-12 DIAGNOSIS — R92.2 INCONCLUSIVE MAMMOGRAM: ICD-10-CM

## 2024-12-12 PROCEDURE — 77061 BREAST TOMOSYNTHESIS UNI: CPT | Performed by: OBSTETRICS & GYNECOLOGY

## 2024-12-12 PROCEDURE — 77065 DX MAMMO INCL CAD UNI: CPT | Performed by: OBSTETRICS & GYNECOLOGY

## 2024-12-12 PROCEDURE — 76642 ULTRASOUND BREAST LIMITED: CPT | Performed by: OBSTETRICS & GYNECOLOGY

## 2025-01-08 ENCOUNTER — LAB ENCOUNTER (OUTPATIENT)
Dept: LAB | Age: 45
End: 2025-01-08
Attending: INTERNAL MEDICINE
Payer: COMMERCIAL

## 2025-01-08 ENCOUNTER — OFFICE VISIT (OUTPATIENT)
Dept: INTERNAL MEDICINE CLINIC | Facility: CLINIC | Age: 45
End: 2025-01-08
Payer: COMMERCIAL

## 2025-01-08 VITALS
RESPIRATION RATE: 12 BRPM | HEIGHT: 67 IN | BODY MASS INDEX: 22.82 KG/M2 | OXYGEN SATURATION: 99 % | TEMPERATURE: 98 F | WEIGHT: 145.38 LBS | SYSTOLIC BLOOD PRESSURE: 108 MMHG | HEART RATE: 75 BPM | DIASTOLIC BLOOD PRESSURE: 64 MMHG

## 2025-01-08 DIAGNOSIS — E53.8 VITAMIN B12 DEFICIENCY: ICD-10-CM

## 2025-01-08 DIAGNOSIS — E06.3 HASHIMOTO'S DISEASE: ICD-10-CM

## 2025-01-08 DIAGNOSIS — E55.9 HYPOVITAMINOSIS D: ICD-10-CM

## 2025-01-08 DIAGNOSIS — Z00.00 ENCOUNTER FOR ROUTINE ADULT MEDICAL EXAMINATION: Primary | ICD-10-CM

## 2025-01-08 DIAGNOSIS — E83.110 HEREDITARY HEMOCHROMATOSIS (HCC): ICD-10-CM

## 2025-01-08 DIAGNOSIS — Z00.00 LABORATORY EXAMINATION ORDERED AS PART OF A ROUTINE GENERAL MEDICAL EXAMINATION: ICD-10-CM

## 2025-01-08 LAB
ALBUMIN SERPL-MCNC: 4.5 G/DL (ref 3.2–4.8)
ALBUMIN/GLOB SERPL: 1.5 {RATIO} (ref 1–2)
ALP LIVER SERPL-CCNC: 67 U/L
ALT SERPL-CCNC: <7 U/L
ANION GAP SERPL CALC-SCNC: 9 MMOL/L (ref 0–18)
AST SERPL-CCNC: 19 U/L (ref ?–34)
BASOPHILS # BLD AUTO: 0.02 X10(3) UL (ref 0–0.2)
BASOPHILS NFR BLD AUTO: 0.3 %
BILIRUB SERPL-MCNC: 0.4 MG/DL (ref 0.3–1.2)
BUN BLD-MCNC: 10 MG/DL (ref 9–23)
CALCIUM BLD-MCNC: 9.6 MG/DL (ref 8.7–10.4)
CHLORIDE SERPL-SCNC: 107 MMOL/L (ref 98–112)
CHOLEST SERPL-MCNC: 143 MG/DL (ref ?–200)
CO2 SERPL-SCNC: 26 MMOL/L (ref 21–32)
CREAT BLD-MCNC: 0.71 MG/DL
DEPRECATED HBV CORE AB SER IA-ACNC: 8 NG/ML
EGFRCR SERPLBLD CKD-EPI 2021: 107 ML/MIN/1.73M2 (ref 60–?)
EOSINOPHIL # BLD AUTO: 0.07 X10(3) UL (ref 0–0.7)
EOSINOPHIL NFR BLD AUTO: 1 %
ERYTHROCYTE [DISTWIDTH] IN BLOOD BY AUTOMATED COUNT: 12.9 %
FASTING PATIENT LIPID ANSWER: YES
FASTING STATUS PATIENT QL REPORTED: YES
GLOBULIN PLAS-MCNC: 3.1 G/DL (ref 2–3.5)
GLUCOSE BLD-MCNC: 97 MG/DL (ref 70–99)
HCT VFR BLD AUTO: 42.1 %
HDLC SERPL-MCNC: 72 MG/DL (ref 40–59)
HGB BLD-MCNC: 13.9 G/DL
IMM GRANULOCYTES # BLD AUTO: 0.02 X10(3) UL (ref 0–1)
IMM GRANULOCYTES NFR BLD: 0.3 %
IRON SATN MFR SERPL: 19 %
IRON SERPL-MCNC: 62 UG/DL
LDLC SERPL CALC-MCNC: 61 MG/DL (ref ?–100)
LYMPHOCYTES # BLD AUTO: 1.42 X10(3) UL (ref 1–4)
LYMPHOCYTES NFR BLD AUTO: 20.1 %
MCH RBC QN AUTO: 28 PG (ref 26–34)
MCHC RBC AUTO-ENTMCNC: 33 G/DL (ref 31–37)
MCV RBC AUTO: 84.9 FL
MONOCYTES # BLD AUTO: 0.44 X10(3) UL (ref 0.1–1)
MONOCYTES NFR BLD AUTO: 6.2 %
NEUTROPHILS # BLD AUTO: 5.1 X10 (3) UL (ref 1.5–7.7)
NEUTROPHILS # BLD AUTO: 5.1 X10(3) UL (ref 1.5–7.7)
NEUTROPHILS NFR BLD AUTO: 72.1 %
NONHDLC SERPL-MCNC: 71 MG/DL (ref ?–130)
OSMOLALITY SERPL CALC.SUM OF ELEC: 293 MOSM/KG (ref 275–295)
PLATELET # BLD AUTO: 257 10(3)UL (ref 150–450)
POTASSIUM SERPL-SCNC: 3.9 MMOL/L (ref 3.5–5.1)
PROT SERPL-MCNC: 7.6 G/DL (ref 5.7–8.2)
RBC # BLD AUTO: 4.96 X10(6)UL
SODIUM SERPL-SCNC: 142 MMOL/L (ref 136–145)
T3FREE SERPL-MCNC: 5.55 PG/ML (ref 2.4–4.2)
T4 FREE SERPL-MCNC: 1.2 NG/DL (ref 0.8–1.7)
TOTAL IRON BINDING CAPACITY: 334 UG/DL (ref 250–425)
TRANSFERRIN SERPL-MCNC: 265 MG/DL (ref 250–380)
TRIGL SERPL-MCNC: 44 MG/DL (ref 30–149)
TSI SER-ACNC: <0.01 UIU/ML (ref 0.55–4.78)
VIT B12 SERPL-MCNC: 512 PG/ML (ref 211–911)
VIT D+METAB SERPL-MCNC: 25.4 NG/ML (ref 30–100)
VLDLC SERPL CALC-MCNC: 6 MG/DL (ref 0–30)
WBC # BLD AUTO: 7.1 X10(3) UL (ref 4–11)

## 2025-01-08 PROCEDURE — 80053 COMPREHEN METABOLIC PANEL: CPT

## 2025-01-08 PROCEDURE — 82728 ASSAY OF FERRITIN: CPT

## 2025-01-08 PROCEDURE — 82607 VITAMIN B-12: CPT

## 2025-01-08 PROCEDURE — 36415 COLL VENOUS BLD VENIPUNCTURE: CPT

## 2025-01-08 PROCEDURE — 84439 ASSAY OF FREE THYROXINE: CPT

## 2025-01-08 PROCEDURE — 84481 FREE ASSAY (FT-3): CPT

## 2025-01-08 PROCEDURE — 80061 LIPID PANEL: CPT

## 2025-01-08 PROCEDURE — 83540 ASSAY OF IRON: CPT

## 2025-01-08 PROCEDURE — 83550 IRON BINDING TEST: CPT

## 2025-01-08 PROCEDURE — 82306 VITAMIN D 25 HYDROXY: CPT

## 2025-01-08 PROCEDURE — 85025 COMPLETE CBC W/AUTO DIFF WBC: CPT

## 2025-01-08 PROCEDURE — 84443 ASSAY THYROID STIM HORMONE: CPT

## 2025-01-08 RX ORDER — LEVONORGESTREL 52 MG/1
1 INTRAUTERINE DEVICE INTRAUTERINE ONCE
COMMUNITY

## 2025-01-08 NOTE — PROGRESS NOTES
[facility] Internal Medicine Office Note  Chief Complaint:   Chief Complaint   Patient presents with    CPX     Reviewed Preventative/Wellness form with patient.        HPI:   This is a 44 year old female coming in for annual physical  HPI  History of hemochromatosis, previously managed by scheduled phlebotomy.  She notes her iron level has gone low due to heavy menstrual bleeding related to fibroid.  She had a new IUD placed to help with this.  She sees a gynecologist    Low vit d and b12 levels in past  Prescribed by functional med who also manages her thyroid medication.    She has been feeling tired and would like her iron levels checked    Patient Active Problem List   Diagnosis    Abdominal bloating    Gluten intolerance    Food allergy    Cyst of left kidney    Vitamin B12 deficiency    Hashimoto's disease    Hypovitaminosis D    Family history of breast cancer in mother    Zinc deficiency     Past Surgical History:   Procedure Laterality Date          x2     Family History   Problem Relation Age of Onset    Cancer Mother     Gastro-Intestinal Disorder Mother         gastritis    Breast Cancer Mother 65        BRCA negative.    Arthritis Father     Other (atrial fibrillation) Father     Other (aortic aneurysm) Father     Gastro-Intestinal Disorder Other         anunts and cousins w/ throyid diease        I reviewed her's Past Medical History, Past Surgical History, Family History and   Social History updated shows  Social History     Socioeconomic History    Marital status:    Tobacco Use    Smoking status: Never    Smokeless tobacco: Never   Vaping Use    Vaping status: Never Used   Substance and Sexual Activity    Alcohol use: Yes     Comment: social    Drug use: No    Sexual activity: Yes     Partners: Male     Birth control/protection: Vasectomy, I.U.D., Mirena   Other Topics Concern    Caffeine Concern Yes     Comment: 16 oz of coffee every morning and sometimes 6 oz in the  afternoon.    Exercise Yes     Comment: 3-5x/week     Allergies:  Allergies[1]  Current Outpatient Medications   Medication Sig Dispense Refill    Levonorgestrel (MIRENA, 52 MG,) 20 MCG/DAY Intrauterine IUD 20 mcg (1 each total) by Intrauterine route one time.      NP THYROID 60 MG Oral Tab Take 2 tablets (120 mg total) by mouth daily.      Zinc 30 MG Oral Tab Take 1 tablet by mouth daily.      CYANOCOBALAMIN 1000 MCG/ML Injection Solution INJECT 1,000 MCG AS DIRECTED EVERY 30 (THIRTY) DAYS. 3 mL 3    LIOTHYRONINE SODIUM 5 MCG Oral Tab Take 2 tablets by mouth daily on an empty stomach (Patient taking differently: Take 1 tablet (5 mcg total) by mouth daily.) 180 tablet 0    EPINEPHrine 0.3 MG/0.3ML Injection Solution Auto-injector       MAGNESIUM GLYCINATE PLUS OR Take 200-400 mg by mouth daily.           REVIEW OF SYSTEMS:   Review of Systems   Constitutional:  Negative for fever.   HENT:  Negative for congestion.    Eyes:  Negative for visual disturbance.   Respiratory:  Negative for shortness of breath.    Cardiovascular:  Negative for chest pain.   Gastrointestinal:  Negative for constipation.   Genitourinary:  Negative for dysuria.   Neurological: Negative.    Hematological: Negative.    Psychiatric/Behavioral: Negative.          EXAM:   /64   Pulse 75   Temp 98 °F (36.7 °C) (Temporal)   Resp 12   Ht 5' 7\" (1.702 m)   Wt 145 lb 6.4 oz (66 kg)   LMP  (LMP Unknown)   SpO2 99%   Breastfeeding No   BMI 22.77 kg/m²  Estimated body mass index is 22.77 kg/m² as calculated from the following:    Height as of this encounter: 5' 7\" (1.702 m).    Weight as of this encounter: 145 lb 6.4 oz (66 kg).   Vital signs reviewed. Appears stated age, well groomed.  Physical Exam  Vitals reviewed.   Constitutional:       General: She is not in acute distress.     Appearance: She is well-developed.   HENT:      Head: Normocephalic and atraumatic.   Eyes:      Conjunctiva/sclera: Conjunctivae normal.   Cardiovascular:       Rate and Rhythm: Normal rate and regular rhythm.      Heart sounds: Normal heart sounds.   Pulmonary:      Effort: Pulmonary effort is normal.      Breath sounds: Normal breath sounds.   Musculoskeletal:      Cervical back: Neck supple.   Skin:     General: Skin is warm and dry.   Neurological:      General: No focal deficit present.      Mental Status: She is alert.   Psychiatric:         Mood and Affect: Mood normal.          ASSESSMENT AND PLAN:   Karen De Jesus is a 44 year old female with  1. Encounter for routine adult medical examination    2. Hashimoto's disease    3. Hereditary hemochromatosis (HCC)    4. Hypovitaminosis D    5. Laboratory examination ordered as part of a routine general medical examination    6. Vitamin B12 deficiency          The plan is as follows  Karen was seen today for cpx.    Diagnoses and all orders for this visit:    Encounter for routine adult medical examination  -Pap smear up-to-date.    -Due for colonoscopy in 2026.  She had a normal colonoscopy in 2016.  She plans to see Dr. Miller  -She had recent mammogram completed    Hashimoto's disease -thyroid is managed by her functional doctor    Hereditary hemochromatosis (HCC) -check labs and will forward to Dr. Shore  -     Ferritin [E]; Future  -     Iron And Tibc [E]; Future    Hypovitaminosis D -low in past, repeat   -     Vitamin D [E]; Future    Laboratory examination ordered as part of a routine general medical examination  -     Lipid Panel; Future  -     TSH W Reflex To Free T4; Future  -     CBC With Differential With Platelet; Future  -     Comp Metabolic Panel (14); Future    Vitamin B12 deficiency -she takes monthly B12 injections  -     Vitamin B12; Future        Orders Placed This Encounter   Procedures    Lipid Panel    TSH W Reflex To Free T4    CBC With Differential With Platelet    Comp Metabolic Panel (14)    Ferritin [E]    Iron And Tibc [E]    Vitamin D [E]    Vitamin B12       Meds & Refills for this  Visit:  Requested Prescriptions      No prescriptions requested or ordered in this encounter       Imaging & Consults:  None    Health Maintenance Due   Topic Date Due    Pap Smear  05/31/2024    COVID-19 Vaccine (2 - 2024-25 season) 09/01/2024    Influenza Vaccine (1) 10/01/2024    Annual Physical  11/29/2024     Patient/Caregiver Education: Patient/Caregiver Education: There are no barriers to learning. Medical education done. Outcome: Patient verbalizes understanding. Patient is notified to call with any questions, complications, allergies, or worsening or changing symptoms.  Patient is to call with any side effects or complications from the treatments as a result of today.     Soheila Valle MD         [1]   Allergies  Allergen Reactions    Dog Fennel     Grass     Mold      This is an error not allergic to mold      Peanuts     Shrimp     Tree Extract     Wheat Gluten     Yeast-Related

## 2025-01-23 ENCOUNTER — OFFICE VISIT (OUTPATIENT)
Dept: SURGERY | Facility: CLINIC | Age: 45
End: 2025-01-23
Payer: COMMERCIAL

## 2025-01-23 VITALS
TEMPERATURE: 99 F | RESPIRATION RATE: 18 BRPM | WEIGHT: 144 LBS | SYSTOLIC BLOOD PRESSURE: 124 MMHG | BODY MASS INDEX: 23 KG/M2 | DIASTOLIC BLOOD PRESSURE: 76 MMHG | OXYGEN SATURATION: 100 % | HEART RATE: 68 BPM

## 2025-01-23 DIAGNOSIS — Z80.3 FAMILY HISTORY OF BREAST CANCER: ICD-10-CM

## 2025-01-23 DIAGNOSIS — Z91.89 AT HIGH RISK FOR BREAST CANCER: ICD-10-CM

## 2025-01-23 DIAGNOSIS — N60.09 CYST OF BREAST, UNSPECIFIED LATERALITY: Primary | ICD-10-CM

## 2025-01-26 NOTE — PROGRESS NOTES
Breast Surgery Surveillance Visit  .    History of Present Illness:   Ms. Karen De Jesus is a 44 year old woman who presents with dense breast, family history of breast cancer and bilateral imaging detected breast cyst.  The patient reports that she has had normal cyst demonstrated before, but that recently she was noted to have complicated cyst and has anxiety related to them given her family history.  The patient herself denies any palpable masses, nipple discharge, breast pain, skin changes and/or axillary disease.  She has no personal prior history of breast cancer.  Her mom has a history of triple negative breast cancer diagnosed at the age of 65.  Her screening mammogram on June 25, 2020 demonstrated extremely dense breast tissue with multiple circumscribed masses thought to represent simple cyst for which ultrasound was recommended.  A bilateral breast complete ultrasound performed on June 29, 2020 demonstrated the largest cyst in the right breast at 12:00 measuring up to 3.5 cm as well as multiple cysts of the left breast and a complex cyst measuring 6 mm at 3:00 as well as an 8 mm complex cyst at 6:00 in the left breast.  She was recommended to have a 6-month surveillance ultrasound of the complex cyst in the left breast since they had not been noted prior.  Most recent imaging was a bilateral screening mammogram on December 3, 2024 which warranted additional views of her right breast.  This took place on December 12, 2024 which showed innumerable cysts scattered throughout the breasts with the largest being up to 3.5 cm in size.  She states these breast cysts do not bother her at this time.  She is here today for evaluation and recommendations for further therapy.        Past Medical History:    Gluten intolerance    Hashimoto's disease    IUD (intrauterine device) in place    Malabsorption (HCC)    Vitamin B 12 deficiency    Vitamin D deficiency       Past Surgical History:   Procedure Laterality Date           x2       Gynecological History:  Pt is a   Pt was 27 years old at time of first pregnancy.    She has cumulative breastfeeding history of 24 months, last unknown.  She achieved menarche at age 11 and LMP     She has history of oral contraceptive use for 8 years, last .  She denies infertility treatment to achieve pregnancy.    Medications:     Levonorgestrel (MIRENA, 52 MG,) 20 MCG/DAY Intrauterine IUD 20 mcg (1 each total) by Intrauterine route one time.      NP THYROID 60 MG Oral Tab Take 2 tablets (120 mg total) by mouth daily.      Zinc 30 MG Oral Tab Take 1 tablet by mouth daily.      CYANOCOBALAMIN 1000 MCG/ML Injection Solution INJECT 1,000 MCG AS DIRECTED EVERY 30 (THIRTY) DAYS. 3 mL 3    LIOTHYRONINE SODIUM 5 MCG Oral Tab Take 2 tablets by mouth daily on an empty stomach (Patient taking differently: Take 1 tablet (5 mcg total) by mouth daily.) 180 tablet 0    EPINEPHrine 0.3 MG/0.3ML Injection Solution Auto-injector       MAGNESIUM GLYCINATE PLUS OR Take 200-400 mg by mouth daily.         Allergies:    Allergies   Allergen Reactions    Dog Fennel     Grass     Mold      This is an error not allergic to mold      Peanuts     Shrimp     Tree Extract     Wheat Gluten     Yeast-Related        Family History:   Family History   Problem Relation Age of Onset    Cancer Mother     Gastro-Intestinal Disorder Mother         gastritis    Breast Cancer Mother 65        BRCA negative.    Arthritis Father     Other (atrial fibrillation) Father     Other (aortic aneurysm) Father     Gastro-Intestinal Disorder Other         anunts and cousins w/ throyid diease       She is not of Ashkenazi [place] ancestry.    Social History:  History   Alcohol Use    Yes     Comment: social       History   Smoking Status    Never   Smokeless Tobacco    Never   The patient is .  She has 2 children.  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    Review of Systems:  General:   The patient  denies, fever, chills, night sweats, fatigue, generalized weakness, change in appetite or weight loss.    HEENT:     The patient denies eye irritation, cataracts, redness, glaucoma, yellowing of the eyes, change in vision or color blindness. The patient denies hearing loss, ringing in the ears, ear drainage, earaches, nasal congestion, nose bleeds, snoring, pain in mouth/throat, hoarseness, change in voice, facial trauma.    Respiratory:  The patient denies chronic cough, phlegm, hemoptysis, pleurisy/chest pain, pneumonia, asthma, wheezing, difficulty in breathing with exertion, emphysema, chronic bronchitis, shortness of breath or abnormal sound when breathing.     Cardiovascular:  There is no history of chest pain, chest pressure/discomfort, palpitations, irregular heartbeat, fainting or near-fainting, difficulty breathing when lying flat, SOB/Coughing at night, swelling of the legs or chest pain while walking.    Breasts:  See history of present illness    Gastrointestinal:     There is no history of difficulty or pain with swallowing, reflux symptoms, vomiting, dark or bloody stools, constipation, yellowing of the skin, indigestion, nausea, change in bowel habits, diarrhea, abdominal pain or vomiting blood.     Genitourinary:  The patient denies frequent urination, needing to get up at night to urinate, urinary hesitancy or retaining urine, painful urination, urinary incontinence, decreased urine stream, blood in the urine or vaginal/penile discharge.    Skin:    The patient denies rash, itching, skin lesions, dry skin, change in skin color or change in moles.     Hematologic/Lymphatic:  The patient denies easily bruising or bleeding or persistent swollen glands or lymph nodes.     Musculoskeletal:  The patient denies muscle aches/pain, joint pain, stiff joints, neck pain, back pain or bone pain.    Neuropsychiatric:  There is no history of migraines or severe headaches, seizure/epilepsy, speech problems,  coordination problems, trembling/tremors, fainting/black outs, dizziness, memory problems, loss of sensation/numbness, problems walking, weakness, tingling or burning in hands/feet. There is no history of abusive relationship, bipolar disorder, sleep disturbance, anxiety, depression or feeling of despair.    Endocrine:    There is no history of poor/slow wound healing, weight loss/gain, fertility or hormone problems, cold intolerance, +thyroid disease.     Allergic/Immunologic:  There is no history of hives, hay fever, angioedema or anaphylaxis.    /76 (BP Location: Left arm, Patient Position: Sitting, Cuff Size: adult)   Pulse 68   Temp 98.8 °F (37.1 °C) (Temporal)   Resp 18   Wt 65.3 kg (144 lb)   LMP  (LMP Unknown)   SpO2 100%   BMI 22.55 kg/m²     Physical Exam:  The patient is an alert, oriented, well-nourished and  well-developed woman who appears her stated age. Her speech patterns and movements are normal. Her affect is appropriate.    HEENT: The head is normocephalic. The neck is supple. The thyroid is not enlarged and is without palpable masses/nodules. There are no palpable masses. The trachea is in the midline. Conjunctiva are clear, non-icteric.    Chest: The chest expands symmetrically. The lungs are clear to auscultation.    Heart: The rhythm is regular.  There are no murmurs, rubs, gallops or thrills.    Breasts:  Her breasts are symmetrical with a cup size B.  Right breast: The skin, nipple ,and areola appear normal. There is no skin dimpling with movement of the pectoralis. There is no nipple retraction. No nipple discharge can be elicited. The parenchyma is mildly nodular. There is a palpable smooth and mobile 2 x 2 centimeter cystic lesion near 12:00 superior central right breast.  The axillary tail is normal.  Left breast:   The skin, nipple, and areola appear normal. There is no skin dimpling with movement of the pectoralis. There is no nipple retraction. No nipple discharge can be  elicited. The parenchyma is mildly nodular. There are no dominant masses in the breast. The axillary tail is normal.    Abdomen:  The abdomen is soft, flat and non tender. The liver is not enlarged. There are no palpable masses.    Lymph Nodes:  The supraclavicular, axillary and cervical regions are free of significant lymphadenopathy.    Back: There is no vertebral column tenderness.    Skin: The skin appears normal. There are no suspicious appearing rashes or lesions.    Extremities: The extremities are without deformity, cyanosis or edema.    Impression:   Ms. Karen De Jesus is a 44 year old woman presents with dense breast, family history of breast cancer and bilateral breast cysts.    Discussion and Plan:  I had a discussion with the Patient regarding her breast exam. On exam today I found a palpable and nontender cyst to her bilateral breasts.  I personally reviewed her recent imaging we discussed this at length.   I explained that simple cysts are a common finding in pre-menopausal women and that no specific intervention is universally warranted. I did explain that if a cyst enlarges or becomes focally symptomatic, occasionally interventions such as aspiration may be performed for transient relief of symptoms. I discussed that the relief is transient as the wall of the cyst maintains its integrity and therefore fluid tends to re-accumulate within the cyst within the next cycle. Excision of cysts is not recommended as the risk of the procedure is generally greater than the benefit of excising the cyst. In addition, simple cysts are not known to be precursor lesions and therefore there is no increased risk for the development of breast cancer in the future.     Due to her extremely dense breast tissue as well as her family history of breast cancer I am recommending breast MRIs in addition to her yearly mammograms.  She will be due for high risk screening MRI in June 2025, and bilateral mammogram in December 2025.   I will be in touch with her with the results of her imaging once they become available.  She should follow-up in 1 year for a clinical exam.. She was given ample opportunity for questions and those questions were answered to her satisfaction. She has been  encouraged to contact the office with any questions or concerns prior to her next appointment.

## 2025-04-11 NOTE — PROGRESS NOTES
[facility] Progress Note      Patient Name:  Karen De Jesus  YOB: 1980  Medical Record Number:  [id number]    Date of visit:  4/14/2025    CHIEF COMPLAINT: Hemochromatosis.    HPI:     44 year old that I have followed since 11/22 after she diag with  hemochromatosis with a heterozygous C282Y and a heterozygous H63D mutation.  IUD last 2 years, menses have been lighter.  Phlebotomy done monthly x3.  Presents for evaluation.    Phlebotomy q 6 months, last 11/24.  Menses bit worse, diag with uterine fibroid, IUD placed, feels better.    SOCIAL HISTORY:    , 2 daughters aged 12 and 14.  Has a twin sister.    ROS:     Constitutional: no fever, chills fatigue,night sweats or weight loss  Neurologic: no headache, seizures, diplopia or weakness  Respiratory: no cough, SOB or hemoptysis  Cardiovascular: no chest pain, ankle swelling, BARR  GI: no nausea, vomiting, diarrhea or BRBPR  Musculoskeletal: no body-ache or joint pain  Dermatologic: no alopecia, rash, pruritis  : no hematuria, dysuria or frequency  Psych: no confusion or depression   Heme: no easy bruising or bleeding     ALLERGIES:    Allergies   Allergen Reactions    Dog Fennel     Grass     Mold      This is an error not allergic to mold      Peanuts     Shrimp     Tree Extract     Wheat Gluten     Yeast-Related        MEDICATIONS:    Current Outpatient Medications   Medication Sig Dispense Refill    Levonorgestrel (MIRENA, 52 MG,) 20 MCG/DAY Intrauterine IUD 20 mcg (1 each total) by Intrauterine route one time.      NP THYROID 60 MG Oral Tab Take 2 tablets (120 mg total) by mouth in the morning.      Zinc 30 MG Oral Tab Take 1 tablet by mouth in the morning.      CYANOCOBALAMIN 1000 MCG/ML Injection Solution INJECT 1,000 MCG AS DIRECTED EVERY 30 (THIRTY) DAYS. 3 mL 3    LIOTHYRONINE SODIUM 5 MCG Oral Tab Take 2 tablets by mouth daily on an empty stomach (Patient taking differently: Take 1 tablet (5 mcg total) by mouth in the morning.) 180  tablet 0    EPINEPHrine 0.3 MG/0.3ML Injection Solution Auto-injector       MAGNESIUM GLYCINATE PLUS OR Take 200-400 mg by mouth in the morning.         VITALS:  Height: 170.2 cm (5' 7.01\") (1115)  Weight: 66.7 kg (147 lb) (1115)  BSA (Calculated - sq m): 1.77 sq meters (1115)  Pulse: 69 (1115)  BP: 130/79 (1115)  Temp: 98.3 °F (36.8 °C) (1115)  Do Not Use - Resp Rate: --  SpO2: 99 % (1115)    PS:  ECO    PHYSICAL EXAM:    General: alert and oriented x 3, not in acute distress.  HEENT: TU, oropharynx  clear.  Neck: supple.  No JVD /adenopathy.  CVS: S1S2, regular  Rhythm, no murmurs.   Lungs: Clear to auscultation and percussion.  Abdomen: Soft, non tender, no hepato-splenomegaly.  Extremities:  No edema.  CNS: no focal deficit    Emotional well being: Patient's emotional well being was assessed.  No issues requiring acute psychosocial intervention were identified.     LABS:     Recent Results (from the past 24 hours)   CBC With Differential With Platelet    Collection Time: 25 11:05 AM   Result Value Ref Range    WBC 6.1 4.0 - 11.0 x10(3) uL    RBC 4.89 3.80 - 5.30 x10(6)uL    HGB 14.4 12.0 - 16.0 g/dL    HCT 42.2 35.0 - 48.0 %    .0 150.0 - 450.0 10(3)uL    MCV 86.3 80.0 - 100.0 fL    MCH 29.4 26.0 - 34.0 pg    MCHC 34.1 31.0 - 37.0 g/dL    RDW 12.8 %    Neutrophil Absolute Prelim 4.04 1.50 - 7.70 x10 (3) uL    Neutrophil Absolute 4.04 1.50 - 7.70 x10(3) uL    Lymphocyte Absolute 1.63 1.00 - 4.00 x10(3) uL    Monocyte Absolute 0.34 0.10 - 1.00 x10(3) uL    Eosinophil Absolute 0.10 0.00 - 0.70 x10(3) uL    Basophil Absolute 0.02 0.00 - 0.20 x10(3) uL    Immature Granulocyte Absolute 0.01 0.00 - 1.00 x10(3) uL    Neutrophil % 65.9 %    Lymphocyte % 26.5 %    Monocyte % 5.5 %    Eosinophil % 1.6 %    Basophil % 0.3 %    Immature Granulocyte % 0.2 %   Comp Metabolic Panel (14)    Collection Time: 25 11:05 AM   Result Value Ref Range    Glucose 108 (H)  70 - 99 mg/dL    Sodium 140 136 - 145 mmol/L    Potassium 4.0 3.5 - 5.1 mmol/L    Chloride 105 98 - 112 mmol/L    CO2 26.0 21.0 - 32.0 mmol/L    Anion Gap 9 0 - 18 mmol/L    BUN 9 9 - 23 mg/dL    Creatinine 0.75 0.55 - 1.02 mg/dL    Calcium, Total 9.9 8.7 - 10.6 mg/dL    Calculated Osmolality 289 275 - 295 mOsm/kg    eGFR-Cr 100 >=60 mL/min/1.73m2    AST 18 <34 U/L    ALT 7 (L) 10 - 49 U/L    Alkaline Phosphatase 69 37 - 98 U/L    Bilirubin, Total 0.4 0.3 - 1.2 mg/dL    Total Protein 7.5 5.7 - 8.2 g/dL    Albumin 4.8 3.2 - 4.8 g/dL    Globulin  2.7 2.0 - 3.5 g/dL    A/G Ratio 1.8 1.0 - 2.0    Patient Fasting for CMP? No        ASSESSMENT AND PLAN:      # Hereditary hemochromatosis: Diag 9/22.  Heterozygous C282Y and heterozygous H63D mutation.  Getting phlebotomy q 6 months.  Will decide on frequency based on her iron panel from today.  Since menses have become less frequent, she may need more phlebotomy.    ORDERS PLACED:          Procedures    CBC With Differential With Platelet    Comp Metabolic Panel (14)    Ferritin    Iron And Tibc    AFP, TUMOR MARKER, SERUM [E]    CBC With Differential With Platelet    Ferritin    Iron And Tibc    CBC With Differential With Platelet    Ferritin    Iron And Tibc       Return for Labs q 4 months. MD 1 year.    Spring Shore M.D.    [facility]  [address] [place] [address] [place], [address]    4/14/2025

## 2025-04-14 ENCOUNTER — OFFICE VISIT (OUTPATIENT)
Age: 45
End: 2025-04-14
Attending: INTERNAL MEDICINE
Payer: COMMERCIAL

## 2025-04-14 VITALS
BODY MASS INDEX: 23.07 KG/M2 | SYSTOLIC BLOOD PRESSURE: 130 MMHG | OXYGEN SATURATION: 99 % | HEART RATE: 69 BPM | DIASTOLIC BLOOD PRESSURE: 79 MMHG | RESPIRATION RATE: 18 BRPM | TEMPERATURE: 98 F | HEIGHT: 67.01 IN | WEIGHT: 147 LBS

## 2025-04-14 DIAGNOSIS — E83.119 HEMOCHROMATOSIS, UNSPECIFIED HEMOCHROMATOSIS TYPE: ICD-10-CM

## 2025-04-14 DIAGNOSIS — E83.19 IRON OVERLOAD: ICD-10-CM

## 2025-04-14 LAB
ALBUMIN SERPL-MCNC: 4.8 G/DL (ref 3.2–4.8)
ALBUMIN/GLOB SERPL: 1.8 {RATIO} (ref 1–2)
ALP LIVER SERPL-CCNC: 69 U/L (ref 37–98)
ALT SERPL-CCNC: 7 U/L (ref 10–49)
ANION GAP SERPL CALC-SCNC: 9 MMOL/L (ref 0–18)
AST SERPL-CCNC: 18 U/L (ref ?–34)
BASOPHILS # BLD AUTO: 0.02 X10(3) UL (ref 0–0.2)
BASOPHILS NFR BLD AUTO: 0.3 %
BILIRUB SERPL-MCNC: 0.4 MG/DL (ref 0.3–1.2)
BUN BLD-MCNC: 9 MG/DL (ref 9–23)
CALCIUM BLD-MCNC: 9.9 MG/DL (ref 8.7–10.6)
CHLORIDE SERPL-SCNC: 105 MMOL/L (ref 98–112)
CO2 SERPL-SCNC: 26 MMOL/L (ref 21–32)
CREAT BLD-MCNC: 0.75 MG/DL (ref 0.55–1.02)
DEPRECATED HBV CORE AB SER IA-ACNC: 15 NG/ML (ref 50–306)
EGFRCR SERPLBLD CKD-EPI 2021: 100 ML/MIN/1.73M2 (ref 60–?)
EOSINOPHIL # BLD AUTO: 0.1 X10(3) UL (ref 0–0.7)
EOSINOPHIL NFR BLD AUTO: 1.6 %
ERYTHROCYTE [DISTWIDTH] IN BLOOD BY AUTOMATED COUNT: 12.8 %
FASTING STATUS PATIENT QL REPORTED: NO
GLOBULIN PLAS-MCNC: 2.7 G/DL (ref 2–3.5)
GLUCOSE BLD-MCNC: 108 MG/DL (ref 70–99)
HCT VFR BLD AUTO: 42.2 % (ref 35–48)
HGB BLD-MCNC: 14.4 G/DL (ref 12–16)
IMM GRANULOCYTES # BLD AUTO: 0.01 X10(3) UL (ref 0–1)
IMM GRANULOCYTES NFR BLD: 0.2 %
IRON SATN MFR SERPL: 38 % (ref 15–50)
IRON SERPL-MCNC: 107 UG/DL (ref 50–170)
LYMPHOCYTES # BLD AUTO: 1.63 X10(3) UL (ref 1–4)
LYMPHOCYTES NFR BLD AUTO: 26.5 %
MCH RBC QN AUTO: 29.4 PG (ref 26–34)
MCHC RBC AUTO-ENTMCNC: 34.1 G/DL (ref 31–37)
MCV RBC AUTO: 86.3 FL (ref 80–100)
MONOCYTES # BLD AUTO: 0.34 X10(3) UL (ref 0.1–1)
MONOCYTES NFR BLD AUTO: 5.5 %
NEUTROPHILS # BLD AUTO: 4.04 X10 (3) UL (ref 1.5–7.7)
NEUTROPHILS # BLD AUTO: 4.04 X10(3) UL (ref 1.5–7.7)
NEUTROPHILS NFR BLD AUTO: 65.9 %
OSMOLALITY SERPL CALC.SUM OF ELEC: 289 MOSM/KG (ref 275–295)
PLATELET # BLD AUTO: 248 10(3)UL (ref 150–450)
POTASSIUM SERPL-SCNC: 4 MMOL/L (ref 3.5–5.1)
PROT SERPL-MCNC: 7.5 G/DL (ref 5.7–8.2)
RBC # BLD AUTO: 4.89 X10(6)UL (ref 3.8–5.3)
SODIUM SERPL-SCNC: 140 MMOL/L (ref 136–145)
TOTAL IRON BINDING CAPACITY: 284 UG/DL (ref 250–425)
TRANSFERRIN SERPL-MCNC: 234 MG/DL (ref 250–380)
WBC # BLD AUTO: 6.1 X10(3) UL (ref 4–11)

## 2025-04-14 NOTE — PROGRESS NOTES
Education Record    Learner:  Patient    Disease / Diagnosis:hemochromatosis   Annual FU       Barriers / Limitations:  None   Comments:    Method:  Discussion   Comments:    General Topics:  Plan of care reviewed   Comments:    Outcome:  Shows understanding   Comments:   Pt dx with uterine fibroid, and had heavy bleeding.   IUD placed.   Last donation was November

## 2025-07-02 ENCOUNTER — HOSPITAL ENCOUNTER (OUTPATIENT)
Dept: MRI IMAGING | Facility: HOSPITAL | Age: 45
Discharge: HOME | End: 2025-07-02

## 2025-07-02 DIAGNOSIS — Z80.3 FAMILY HISTORY OF BREAST CANCER: ICD-10-CM

## 2025-07-02 DIAGNOSIS — Z91.89 AT HIGH RISK FOR BREAST CANCER: ICD-10-CM

## 2025-07-02 PROCEDURE — A9575 INJ GADOTERATE MEGLUMI 0.1ML: HCPCS

## 2025-07-02 PROCEDURE — 77049 MRI BREAST C-+ W/CAD BI: CPT

## 2025-07-02 RX ORDER — GADOTERATE MEGLUMINE 376.9 MG/ML
15 INJECTION INTRAVENOUS
Status: COMPLETED | OUTPATIENT
Start: 2025-07-02 | End: 2025-07-02

## 2025-07-02 RX ADMIN — GADOTERATE MEGLUMINE 14 ML: 376.9 INJECTION INTRAVENOUS at 17:58:00

## (undated) DIAGNOSIS — Z12.31 ENCOUNTER FOR SCREENING MAMMOGRAM FOR BREAST CANCER: Primary | ICD-10-CM

## (undated) NOTE — MR AVS SNAPSHOT
[facility] [address]  [facility] [address] [phone]               Thank you for choosing us for your health care visit with [facility] NURSE.   We are glad to serve you and happy to provide you with this Take 1 capsule (50,000 Units total) by mouth once a week. Commonly known as:  DRISDOL/VITAMIN D2           GIANVI 3-0.02 MG Tabs   Generic drug:  Drospirenone-Ethinyl Estradiol   Take 1 tablet by mouth daily.            Ipratropium Bromide 0.03 % Soln   2

## (undated) NOTE — MR AVS SNAPSHOT
[facility] [address]  [address] [facility]  [phone]               Thank you for choosing us for your health care visit with Annamaria Cantu MD.  We are glad to serve you and happy to provide you with • Please call our office about any questions regarding your treatment/medicines/tests as a result of today's visit.  For your safety, read the entire package insert of all medicines prescribed to you and be aware of all of the risks of treatment even beyon BP Pulse Temp Height Weight BMI    90/70 mmHg 68 98.7 °F (37.1 °C) (Temporal) 66.25\" 125 lb 20.02 kg/m2         Current Medications          This list is accurate as of: 5/5/17 10:14 AM.  Always use your most recent med list.                Desiccated Be

## (undated) NOTE — MR AVS SNAPSHOT
[facility] [address]  [facility] [id number] [phone]               Thank you for choosing us for your health care visit with EMG 20 NURSE.   We are glad to serve you and happy to provide you with this This list is accurate as of: 1/13/17  9:55 AM.  Always use your most recent med list.                alprazolam 0.25 MG Tabs   Commonly known as:  XANAX           Dicyclomine HCl 20 MG Tabs   Take 1 tablet (20 mg total) by mouth 4 (four) times daily as nee

## (undated) NOTE — MR AVS SNAPSHOT
[facility] [address]  [facility] [id number] [phone]               Thank you for choosing us for your health care visit with EMG 20 NURSE.   We are glad to serve you and happy to provide you with this Dicyclomine HCl 20 MG Tabs   Take 1 tablet (20 mg total) by mouth 4 (four) times daily as needed. Commonly known as:  BENTYL           ergocalciferol 98350 units Caps   Take 1 capsule (50,000 Units total) by mouth once a week.    Commonly known as:  KRISTA

## (undated) NOTE — LETTER
07/11/19        Karen De Jesus  [address] [address]      Dear Sujey Moser,    Our records indicate that you have outstanding lab work and or testing that was ordered for you and has not yet been completed: Ultrasound of Kidneys + Non fa

## (undated) NOTE — MR AVS SNAPSHOT
[facility] [address]  [facility] [id number] [phone]               Thank you for choosing us for your health care visit with EMG 20 NURSE.   We are glad to serve you and happy to provide you with this GIANVI 3-0.02 MG Tabs   Generic drug:  Drospirenone-Ethinyl Estradiol   Take 1 tablet by mouth daily. Ipratropium Bromide 0.03 % Soln   2 sprays by Nasal route every 12 (twelve) hours.    Commonly known as:  ATROVENT           Levothyroxine Sodiu